# Patient Record
Sex: FEMALE | Race: OTHER
[De-identification: names, ages, dates, MRNs, and addresses within clinical notes are randomized per-mention and may not be internally consistent; named-entity substitution may affect disease eponyms.]

---

## 2020-07-02 LAB
ALBUMIN SERPL-MCNC: 3.7 G/DL (ref 3.4–5)
ALP SERPL-CCNC: 94 U/L (ref 45–117)
ALT SERPL-CCNC: 28 U/L (ref 13–56)
ANION GAP SERPL CALCULATED.3IONS-SCNC: 5 MMOL/L (ref 5–15)
APTT PPP: 27.5 SEC (ref 23.64–32.05)
BILIRUB SERPL-MCNC: 0.5 MG/DL (ref 0.2–1)
BUN SERPL-MCNC: 12 MG/DL (ref 7–18)
BUN/CREAT SERPL: 17.6
CALCIUM SERPL-MCNC: 8.9 MG/DL (ref 8.5–10.1)
CHLORIDE SERPL-SCNC: 110 MMOL/L (ref 98–107)
CO2 SERPL-SCNC: 26 MMOL/L (ref 21–32)
GLUCOSE SERPL-MCNC: 87 MG/DL (ref 74–106)
HCT VFR BLD AUTO: 44.9 % (ref 36–46)
HGB BLD-MCNC: 14.7 G/DL (ref 12.2–16.2)
INR PPP: 0.99 (ref 0.9–1.15)
MCH RBC QN AUTO: 27.8 PG (ref 28–32)
MCV RBC AUTO: 84.8 FL (ref 80–100)
NRBC BLD QL AUTO: 0 %
POTASSIUM SERPL-SCNC: 3.8 MMOL/L (ref 3.5–5.1)
PROT SERPL-MCNC: 7.7 G/DL (ref 6.4–8.2)
SODIUM SERPL-SCNC: 141 MMOL/L (ref 136–145)

## 2020-07-06 ENCOUNTER — HOSPITAL ENCOUNTER (INPATIENT)
Dept: HOSPITAL 15 - OVERFLOW | Age: 60
LOS: 2 days | Discharge: HOME | DRG: 470 | End: 2020-07-08
Attending: INTERNAL MEDICINE | Admitting: ORTHOPAEDIC SURGERY
Payer: COMMERCIAL

## 2020-07-06 VITALS — DIASTOLIC BLOOD PRESSURE: 69 MMHG | SYSTOLIC BLOOD PRESSURE: 115 MMHG

## 2020-07-06 VITALS — HEIGHT: 67 IN | WEIGHT: 220.68 LBS | BODY MASS INDEX: 34.64 KG/M2

## 2020-07-06 VITALS — DIASTOLIC BLOOD PRESSURE: 60 MMHG | SYSTOLIC BLOOD PRESSURE: 105 MMHG

## 2020-07-06 DIAGNOSIS — Z11.59: ICD-10-CM

## 2020-07-06 DIAGNOSIS — E66.9: ICD-10-CM

## 2020-07-06 DIAGNOSIS — F32.9: ICD-10-CM

## 2020-07-06 DIAGNOSIS — I10: ICD-10-CM

## 2020-07-06 DIAGNOSIS — M17.12: Primary | ICD-10-CM

## 2020-07-06 DIAGNOSIS — J45.909: ICD-10-CM

## 2020-07-06 PROCEDURE — 85018 HEMOGLOBIN: CPT

## 2020-07-06 PROCEDURE — 85025 COMPLETE CBC W/AUTO DIFF WBC: CPT

## 2020-07-06 PROCEDURE — 86900 BLOOD TYPING SEROLOGIC ABO: CPT

## 2020-07-06 PROCEDURE — 86901 BLOOD TYPING SEROLOGIC RH(D): CPT

## 2020-07-06 PROCEDURE — 85610 PROTHROMBIN TIME: CPT

## 2020-07-06 PROCEDURE — 36415 COLL VENOUS BLD VENIPUNCTURE: CPT

## 2020-07-06 PROCEDURE — 85730 THROMBOPLASTIN TIME PARTIAL: CPT

## 2020-07-06 PROCEDURE — 97163 PT EVAL HIGH COMPLEX 45 MIN: CPT

## 2020-07-06 PROCEDURE — 73562 X-RAY EXAM OF KNEE 3: CPT

## 2020-07-06 PROCEDURE — 81001 URINALYSIS AUTO W/SCOPE: CPT

## 2020-07-06 PROCEDURE — 86850 RBC ANTIBODY SCREEN: CPT

## 2020-07-06 PROCEDURE — 0SRD0J9 REPLACEMENT OF LEFT KNEE JOINT WITH SYNTHETIC SUBSTITUTE, CEMENTED, OPEN APPROACH: ICD-10-PCS | Performed by: ORTHOPAEDIC SURGERY

## 2020-07-06 PROCEDURE — 80053 COMPREHEN METABOLIC PANEL: CPT

## 2020-07-06 PROCEDURE — 85014 HEMATOCRIT: CPT

## 2020-07-06 RX ADMIN — HYDROCORTISONE SODIUM SUCCINATE SCH MG: 100 INJECTION, POWDER, FOR SOLUTION INTRAMUSCULAR; INTRAVENOUS at 23:00

## 2020-07-06 RX ADMIN — DOCUSATE SODIUM SCH MG: 100 CAPSULE, LIQUID FILLED ORAL at 22:00

## 2020-07-06 RX ADMIN — SODIUM CHLORIDE SCH ML: 9 INJECTION INTRAMUSCULAR; INTRAVENOUS; SUBCUTANEOUS at 22:00

## 2020-07-06 RX ADMIN — KETOROLAC TROMETHAMINE SCH MG: 30 INJECTION, SOLUTION INTRAMUSCULAR; INTRAVENOUS at 18:00

## 2020-07-06 RX ADMIN — SODIUM CHLORIDE, SODIUM LACTATE, POTASSIUM CHLORIDE, AND CALCIUM CHLORIDE SCH MLS/HR: .6; .31; .03; .02 INJECTION, SOLUTION INTRAVENOUS at 16:10

## 2020-07-06 RX ADMIN — CEFAZOLIN SODIUM SCH MLS/HR: 1 INJECTION, SOLUTION INTRAVENOUS at 21:09

## 2020-07-06 NOTE — NUR
ALBUTEROL 2.5MG ONCE/PRN NOT GIVEN. PT ESTATES SHE DOES NOT NEED IT . PT IS AWAKE, ALERT AND 
ORIENTED. PT ON RA, 95% O2 SATS, BS ARE DIMINISHED TO AUSCULTATION, SKIN IS DRY AND WARM TO 
THE TOUCH. HR  BPM, /70. WILL CONTINUE TO MONITOR PT.

## 2020-07-06 NOTE — NUR
S/P OR assessment

Patient to room  from O.R. procedure of total left knee arthroplasty. Vital signs taken, 
surgical site assessed for redness, swelling, or bleeding.Applied alexia hose,SCDs, continous 
Puls Ox. Patient instructed on need to inform staff immediately for any pain, swelling, 
bleeding or pulling or popping sensations at surgical site. Patient verbalized 
understanding.Duramorph protocol will be implemented.

## 2020-07-07 VITALS — SYSTOLIC BLOOD PRESSURE: 116 MMHG | DIASTOLIC BLOOD PRESSURE: 75 MMHG

## 2020-07-07 VITALS — DIASTOLIC BLOOD PRESSURE: 69 MMHG | SYSTOLIC BLOOD PRESSURE: 112 MMHG

## 2020-07-07 VITALS — SYSTOLIC BLOOD PRESSURE: 114 MMHG | DIASTOLIC BLOOD PRESSURE: 70 MMHG

## 2020-07-07 VITALS — SYSTOLIC BLOOD PRESSURE: 104 MMHG | DIASTOLIC BLOOD PRESSURE: 66 MMHG

## 2020-07-07 VITALS — SYSTOLIC BLOOD PRESSURE: 108 MMHG | DIASTOLIC BLOOD PRESSURE: 67 MMHG

## 2020-07-07 VITALS — SYSTOLIC BLOOD PRESSURE: 105 MMHG | DIASTOLIC BLOOD PRESSURE: 63 MMHG

## 2020-07-07 VITALS — SYSTOLIC BLOOD PRESSURE: 102 MMHG | DIASTOLIC BLOOD PRESSURE: 59 MMHG

## 2020-07-07 VITALS — SYSTOLIC BLOOD PRESSURE: 119 MMHG | DIASTOLIC BLOOD PRESSURE: 70 MMHG

## 2020-07-07 VITALS — DIASTOLIC BLOOD PRESSURE: 63 MMHG | SYSTOLIC BLOOD PRESSURE: 112 MMHG

## 2020-07-07 VITALS — DIASTOLIC BLOOD PRESSURE: 61 MMHG | SYSTOLIC BLOOD PRESSURE: 104 MMHG

## 2020-07-07 VITALS — DIASTOLIC BLOOD PRESSURE: 69 MMHG | SYSTOLIC BLOOD PRESSURE: 115 MMHG

## 2020-07-07 VITALS — SYSTOLIC BLOOD PRESSURE: 112 MMHG | DIASTOLIC BLOOD PRESSURE: 66 MMHG

## 2020-07-07 VITALS — DIASTOLIC BLOOD PRESSURE: 58 MMHG | SYSTOLIC BLOOD PRESSURE: 99 MMHG

## 2020-07-07 VITALS — DIASTOLIC BLOOD PRESSURE: 59 MMHG | SYSTOLIC BLOOD PRESSURE: 107 MMHG

## 2020-07-07 VITALS — DIASTOLIC BLOOD PRESSURE: 67 MMHG | SYSTOLIC BLOOD PRESSURE: 108 MMHG

## 2020-07-07 VITALS — DIASTOLIC BLOOD PRESSURE: 65 MMHG | SYSTOLIC BLOOD PRESSURE: 107 MMHG

## 2020-07-07 VITALS — DIASTOLIC BLOOD PRESSURE: 58 MMHG | SYSTOLIC BLOOD PRESSURE: 95 MMHG

## 2020-07-07 VITALS — SYSTOLIC BLOOD PRESSURE: 118 MMHG | DIASTOLIC BLOOD PRESSURE: 65 MMHG

## 2020-07-07 VITALS — SYSTOLIC BLOOD PRESSURE: 114 MMHG | DIASTOLIC BLOOD PRESSURE: 64 MMHG

## 2020-07-07 VITALS — DIASTOLIC BLOOD PRESSURE: 66 MMHG | SYSTOLIC BLOOD PRESSURE: 116 MMHG

## 2020-07-07 VITALS — DIASTOLIC BLOOD PRESSURE: 60 MMHG | SYSTOLIC BLOOD PRESSURE: 110 MMHG

## 2020-07-07 LAB
ALBUMIN SERPL-MCNC: 3 G/DL (ref 3.4–5)
ALP SERPL-CCNC: 74 U/L (ref 45–117)
ALT SERPL-CCNC: 22 U/L (ref 13–56)
ANION GAP SERPL CALCULATED.3IONS-SCNC: 4 MMOL/L (ref 5–15)
BILIRUB SERPL-MCNC: 0.5 MG/DL (ref 0.2–1)
BUN SERPL-MCNC: 14 MG/DL (ref 7–18)
BUN/CREAT SERPL: 17.9
CALCIUM SERPL-MCNC: 8.1 MG/DL (ref 8.5–10.1)
CHLORIDE SERPL-SCNC: 108 MMOL/L (ref 98–107)
CO2 SERPL-SCNC: 26 MMOL/L (ref 21–32)
GLUCOSE SERPL-MCNC: 107 MG/DL (ref 74–106)
HCT VFR BLD AUTO: 36.5 % (ref 36–46)
HGB BLD-MCNC: 12.1 G/DL (ref 12.2–16.2)
POTASSIUM SERPL-SCNC: 3.7 MMOL/L (ref 3.5–5.1)
PROT SERPL-MCNC: 6.4 G/DL (ref 6.4–8.2)
SODIUM SERPL-SCNC: 138 MMOL/L (ref 136–145)

## 2020-07-07 RX ADMIN — LOSARTAN POTASSIUM SCH MG: 50 TABLET, FILM COATED ORAL at 09:12

## 2020-07-07 RX ADMIN — SODIUM CHLORIDE SCH ML: 9 INJECTION INTRAMUSCULAR; INTRAVENOUS; SUBCUTANEOUS at 14:00

## 2020-07-07 RX ADMIN — DOCUSATE SODIUM SCH MG: 100 CAPSULE, LIQUID FILLED ORAL at 09:12

## 2020-07-07 RX ADMIN — DOCUSATE SODIUM SCH MG: 100 CAPSULE, LIQUID FILLED ORAL at 21:38

## 2020-07-07 RX ADMIN — KETOROLAC TROMETHAMINE SCH MG: 30 INJECTION, SOLUTION INTRAMUSCULAR; INTRAVENOUS at 00:09

## 2020-07-07 RX ADMIN — HYDROCORTISONE SODIUM SUCCINATE SCH MG: 100 INJECTION, POWDER, FOR SOLUTION INTRAMUSCULAR; INTRAVENOUS at 06:13

## 2020-07-07 RX ADMIN — KETOROLAC TROMETHAMINE SCH MG: 30 INJECTION, SOLUTION INTRAMUSCULAR; INTRAVENOUS at 23:46

## 2020-07-07 RX ADMIN — KETOROLAC TROMETHAMINE SCH MG: 30 INJECTION, SOLUTION INTRAMUSCULAR; INTRAVENOUS at 18:04

## 2020-07-07 RX ADMIN — FLUTICASONE PROPIONATE SCH MCG: 50 SPRAY, METERED NASAL at 09:12

## 2020-07-07 RX ADMIN — CEFAZOLIN SODIUM SCH MLS/HR: 1 INJECTION, SOLUTION INTRAVENOUS at 09:11

## 2020-07-07 RX ADMIN — KETOROLAC TROMETHAMINE SCH MG: 30 INJECTION, SOLUTION INTRAMUSCULAR; INTRAVENOUS at 11:45

## 2020-07-07 RX ADMIN — ALBUTEROL SULFATE SCH MCG: 90 AEROSOL, METERED RESPIRATORY (INHALATION) at 09:11

## 2020-07-07 RX ADMIN — CEFAZOLIN SODIUM SCH MLS/HR: 1 INJECTION, SOLUTION INTRAVENOUS at 03:15

## 2020-07-07 RX ADMIN — ENOXAPARIN SODIUM SCH MG: 40 INJECTION SUBCUTANEOUS at 09:14

## 2020-07-07 RX ADMIN — SERTRALINE HYDROCHLORIDE SCH MG: 50 TABLET, FILM COATED ORAL at 09:14

## 2020-07-07 RX ADMIN — SODIUM CHLORIDE SCH ML: 9 INJECTION INTRAMUSCULAR; INTRAVENOUS; SUBCUTANEOUS at 06:13

## 2020-07-07 RX ADMIN — MONTELUKAST SODIUM SCH MG: 10 TABLET, FILM COATED ORAL at 09:14

## 2020-07-07 RX ADMIN — HYDROCORTISONE SODIUM SUCCINATE SCH MG: 100 INJECTION, POWDER, FOR SOLUTION INTRAMUSCULAR; INTRAVENOUS at 14:00

## 2020-07-07 RX ADMIN — SODIUM CHLORIDE, SODIUM LACTATE, POTASSIUM CHLORIDE, AND CALCIUM CHLORIDE SCH MLS/HR: .6; .31; .03; .02 INJECTION, SOLUTION INTRAVENOUS at 11:45

## 2020-07-07 RX ADMIN — SODIUM CHLORIDE SCH ML: 9 INJECTION INTRAMUSCULAR; INTRAVENOUS; SUBCUTANEOUS at 21:38

## 2020-07-07 RX ADMIN — KETOROLAC TROMETHAMINE SCH MG: 30 INJECTION, SOLUTION INTRAMUSCULAR; INTRAVENOUS at 06:14

## 2020-07-07 RX ADMIN — HYDROCORTISONE SODIUM SUCCINATE SCH MG: 100 INJECTION, POWDER, FOR SOLUTION INTRAMUSCULAR; INTRAVENOUS at 21:38

## 2020-07-07 RX ADMIN — PANTOPRAZOLE SODIUM SCH MG: 40 TABLET, DELAYED RELEASE ORAL at 09:13

## 2020-07-07 RX ADMIN — SODIUM CHLORIDE, SODIUM LACTATE, POTASSIUM CHLORIDE, AND CALCIUM CHLORIDE SCH MLS/HR: .6; .31; .03; .02 INJECTION, SOLUTION INTRAVENOUS at 02:10

## 2020-07-07 NOTE — NUR
Respiratory note:

PT FOUND TO BE AWAKE/ALERT SITTING IN BED. DENIES ANY SOB OR DIFF BREATHING, NO DISTRESS 
NOTED. HR 80 RR 18 SPO2 98% ON 3L N/C. PT STATES DOES NOT NEED TX AT THIS TIME. PT AND RN 
AWARE TO HAVE PAGED IF NEEDED.

## 2020-07-07 NOTE — NUR
Assessment



Patient is a 59-year-old female who is alert and oriented. Prior to admission patient lived 
home with family and functioned independently. Patient informed me she does not have any 
medical equipment now. Per patient she will return home to her prior living arrangements 
post discharge and family will transport her home. Advised patient there is a social service 
consult for home health service for physical therapy, bed side commode, walker and CPM. 
Informed patient clinical information will be faxed to contracted agency for home health and 
[facility] for the medical equipment. Informed patient she has a right to participate in all 
discharge planning. Patient verbalized understanding and agreed to discharge plan home. 



Faxed clinical information to [facility] and [facility]. Per Rylie with [facility] medical 
equipment will be deliver to bedside. Per Sheba with [facility] patient has a 
1500 deductible and will have to pay 70% per visit. Per Sheba with [facility] suggested 
outpatient physical therapy as it will be a less co-pay for patient. Informed patient 
regarding home health and copay. Offered patient with option of outpatient physical therapy. 
Per Patient she will do outpatient physical therapy. Patient verbalize understanding d/c 
plan. Informed BOB Pablo 


-------------------------------------------------------------------------------

Addendum: 07/08/20 at 0908 by FAN MURO

-------------------------------------------------------------------------------

Amended: Links added.

## 2020-07-07 NOTE — NUR
Pain Medication Administration

Patient complaining of left knee pain 9/10. Patient requesting Norco to be administered. 
Explained pain medications and pain level scale to patient, patient verbalized 
understanding, continues to request Norco. Will administer pain medication per MD order. 
Will reassess pain level and will continue to monitor. 

-------------------------------------------------------------------------------

Addendum: 07/08/20 at 0747 by KRISTIN FOSS RN RN

-------------------------------------------------------------------------------

Correct Date 7/8/20

## 2020-07-08 VITALS — DIASTOLIC BLOOD PRESSURE: 80 MMHG | SYSTOLIC BLOOD PRESSURE: 141 MMHG

## 2020-07-08 VITALS — DIASTOLIC BLOOD PRESSURE: 62 MMHG | SYSTOLIC BLOOD PRESSURE: 106 MMHG

## 2020-07-08 VITALS — DIASTOLIC BLOOD PRESSURE: 76 MMHG | SYSTOLIC BLOOD PRESSURE: 139 MMHG

## 2020-07-08 VITALS — DIASTOLIC BLOOD PRESSURE: 73 MMHG | SYSTOLIC BLOOD PRESSURE: 124 MMHG

## 2020-07-08 LAB
HCT VFR BLD AUTO: 35.4 % (ref 36–46)
HGB BLD-MCNC: 11.7 G/DL (ref 12.2–16.2)

## 2020-07-08 RX ADMIN — SODIUM CHLORIDE SCH ML: 9 INJECTION INTRAMUSCULAR; INTRAVENOUS; SUBCUTANEOUS at 05:48

## 2020-07-08 RX ADMIN — ENOXAPARIN SODIUM SCH MG: 40 INJECTION SUBCUTANEOUS at 09:48

## 2020-07-08 RX ADMIN — SERTRALINE HYDROCHLORIDE SCH MG: 50 TABLET, FILM COATED ORAL at 09:49

## 2020-07-08 RX ADMIN — MONTELUKAST SODIUM SCH MG: 10 TABLET, FILM COATED ORAL at 09:48

## 2020-07-08 RX ADMIN — ALBUTEROL SULFATE SCH MCG: 90 AEROSOL, METERED RESPIRATORY (INHALATION) at 09:50

## 2020-07-08 RX ADMIN — DOCUSATE SODIUM SCH MG: 100 CAPSULE, LIQUID FILLED ORAL at 09:49

## 2020-07-08 RX ADMIN — PANTOPRAZOLE SODIUM SCH MG: 40 TABLET, DELAYED RELEASE ORAL at 09:49

## 2020-07-08 RX ADMIN — SODIUM CHLORIDE SCH ML: 9 INJECTION INTRAMUSCULAR; INTRAVENOUS; SUBCUTANEOUS at 14:13

## 2020-07-08 RX ADMIN — LOSARTAN POTASSIUM SCH MG: 50 TABLET, FILM COATED ORAL at 09:50

## 2020-07-08 RX ADMIN — FLUTICASONE PROPIONATE SCH MCG: 50 SPRAY, METERED NASAL at 09:50

## 2020-07-08 NOTE — NUR
Pain Level Reassessment

Patient asleep for pain level reassessment. No signs or symptoms of distress noted. Will 
continue to monitor.

## 2020-07-08 NOTE — NUR
ASSESSMENT NOTE

PT IS ALERT ORIENTED X4, RESTING IN BED COMFORTABLY, NO DISTRESS NOTED, ACE WRAP NOTED AT 
THE LEFT KNEE, SELF REPOSITION AS NEEDED, PAIN 0/10, CALL LIGHT WITHIN REACH

## 2020-07-08 NOTE — NUR
LOVENOX

PATIENT WAS ABLE TO GIVE HER SELF THE LOVENOX SHOT INDEPENDENTLY AFTER WATCHING AN EDUCATION 
VIDEO IN [employer] TWICE

## 2020-07-08 NOTE — NUR
DR LOWRY

AT BED SIDE, LEARNED THAT THE TEMP IS BACK TO NORMAL 98.7, INFORM PT THAT SHE CAN GO HOME 
AND COME BACK TO ER FOR ANY CHANGES

## 2020-07-08 NOTE — NUR
TEMP

100.2, SPOKE WITH DR GRUBER OVER THE PHONE, SAID TO GIVE PT TYLENOL ALSO MADE AWARE THAT DR LOWRY WILL ROUND ON HER

## 2020-07-08 NOTE — NUR
CPM

PT STATE I NEED THE PHYSICAL THERAPY TO LEARN HOW TO USE THE CPM MACHINE, SPOKE WITH KAVEH 
PT OVER THE PHONE SAID THAT HE WILL SEND SOMEONE THERE

## 2020-12-03 ENCOUNTER — HOSPITAL ENCOUNTER (EMERGENCY)
Dept: HOSPITAL 15 - ER | Age: 60
LOS: 1 days | Discharge: HOME | End: 2020-12-04
Payer: COMMERCIAL

## 2020-12-03 VITALS — BODY MASS INDEX: 32.96 KG/M2 | HEIGHT: 67 IN | WEIGHT: 210 LBS

## 2020-12-03 DIAGNOSIS — U07.1: Primary | ICD-10-CM

## 2020-12-03 DIAGNOSIS — E87.6: ICD-10-CM

## 2020-12-03 DIAGNOSIS — I95.9: ICD-10-CM

## 2020-12-03 LAB
ALBUMIN SERPL-MCNC: 3 G/DL (ref 3.4–5)
ALP SERPL-CCNC: 70 U/L (ref 45–117)
ALT SERPL-CCNC: 34 U/L (ref 13–56)
ANION GAP SERPL CALCULATED.3IONS-SCNC: 7 MMOL/L (ref 5–15)
BILIRUB SERPL-MCNC: 0.3 MG/DL (ref 0.2–1)
BUN SERPL-MCNC: 12 MG/DL (ref 7–18)
BUN/CREAT SERPL: 11.2
CALCIUM SERPL-MCNC: 7.7 MG/DL (ref 8.5–10.1)
CHLORIDE SERPL-SCNC: 107 MMOL/L (ref 98–107)
CO2 SERPL-SCNC: 23 MMOL/L (ref 21–32)
GLUCOSE SERPL-MCNC: 107 MG/DL (ref 74–106)
HCT VFR BLD AUTO: 35.4 % (ref 36–46)
HGB BLD-MCNC: 11.2 G/DL (ref 12.2–16.2)
MCH RBC QN AUTO: 24.3 PG (ref 28–32)
MCV RBC AUTO: 76.6 FL (ref 80–100)
NRBC BLD QL AUTO: 0.1 %
POTASSIUM SERPL-SCNC: 3.1 MMOL/L (ref 3.5–5.1)
PROT SERPL-MCNC: 6.9 G/DL (ref 6.4–8.2)
SODIUM SERPL-SCNC: 137 MMOL/L (ref 136–145)

## 2020-12-03 PROCEDURE — 99285 EMERGENCY DEPT VISIT HI MDM: CPT

## 2020-12-03 PROCEDURE — 84484 ASSAY OF TROPONIN QUANT: CPT

## 2020-12-03 PROCEDURE — 96374 THER/PROPH/DIAG INJ IV PUSH: CPT

## 2020-12-03 PROCEDURE — 36415 COLL VENOUS BLD VENIPUNCTURE: CPT

## 2020-12-03 PROCEDURE — 81001 URINALYSIS AUTO W/SCOPE: CPT

## 2020-12-03 PROCEDURE — 83880 ASSAY OF NATRIURETIC PEPTIDE: CPT

## 2020-12-03 PROCEDURE — 93005 ELECTROCARDIOGRAM TRACING: CPT

## 2020-12-03 PROCEDURE — 96361 HYDRATE IV INFUSION ADD-ON: CPT

## 2020-12-03 PROCEDURE — 80053 COMPREHEN METABOLIC PANEL: CPT

## 2020-12-03 PROCEDURE — 71045 X-RAY EXAM CHEST 1 VIEW: CPT

## 2020-12-03 PROCEDURE — 87426 SARSCOV CORONAVIRUS AG IA: CPT

## 2020-12-03 PROCEDURE — 70450 CT HEAD/BRAIN W/O DYE: CPT

## 2020-12-03 PROCEDURE — 85025 COMPLETE CBC W/AUTO DIFF WBC: CPT

## 2020-12-04 VITALS — DIASTOLIC BLOOD PRESSURE: 64 MMHG | SYSTOLIC BLOOD PRESSURE: 155 MMHG

## 2020-12-05 ENCOUNTER — HOSPITAL ENCOUNTER (INPATIENT)
Dept: HOSPITAL 15 - ER | Age: 60
LOS: 41 days | Discharge: HOME | DRG: 710 | End: 2021-01-15
Attending: INTERNAL MEDICINE | Admitting: INTERNAL MEDICINE
Payer: COMMERCIAL

## 2020-12-05 VITALS — HEIGHT: 67 IN | BODY MASS INDEX: 31.07 KG/M2 | WEIGHT: 197.98 LBS

## 2020-12-05 DIAGNOSIS — D68.59: ICD-10-CM

## 2020-12-05 DIAGNOSIS — E66.9: ICD-10-CM

## 2020-12-05 DIAGNOSIS — J98.2: ICD-10-CM
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
## 2020-12-05 DIAGNOSIS — J96.01: ICD-10-CM

## 2020-12-05 DIAGNOSIS — R73.9: ICD-10-CM

## 2020-12-05 DIAGNOSIS — I10: ICD-10-CM

## 2020-12-05 DIAGNOSIS — D89.839: ICD-10-CM

## 2020-12-05 DIAGNOSIS — J45.909: ICD-10-CM

## 2020-12-05 DIAGNOSIS — Z79.899: ICD-10-CM

## 2020-12-05 DIAGNOSIS — J12.82: ICD-10-CM

## 2020-12-05 DIAGNOSIS — M19.90: ICD-10-CM

## 2020-12-05 DIAGNOSIS — F32.9: ICD-10-CM

## 2020-12-05 DIAGNOSIS — U07.1: ICD-10-CM

## 2020-12-05 DIAGNOSIS — E44.0: ICD-10-CM

## 2020-12-05 DIAGNOSIS — K65.9: ICD-10-CM

## 2020-12-05 DIAGNOSIS — A41.89: Primary | ICD-10-CM

## 2020-12-05 DIAGNOSIS — E87.6: ICD-10-CM

## 2020-12-05 DIAGNOSIS — E66.01: ICD-10-CM

## 2020-12-05 DIAGNOSIS — K57.20: ICD-10-CM

## 2020-12-05 LAB
ALBUMIN SERPL-MCNC: 2.9 G/DL (ref 3.4–5)
ALP SERPL-CCNC: 74 U/L (ref 45–117)
ALT SERPL-CCNC: 43 U/L (ref 13–56)
ANION GAP SERPL CALCULATED.3IONS-SCNC: 9 MMOL/L (ref 5–15)
BILIRUB SERPL-MCNC: 0.3 MG/DL (ref 0.2–1)
BUN SERPL-MCNC: 10 MG/DL (ref 7–18)
BUN/CREAT SERPL: 12.2
CALCIUM SERPL-MCNC: 9 MG/DL (ref 8.5–10.1)
CHLORIDE SERPL-SCNC: 110 MMOL/L (ref 98–107)
CO2 SERPL-SCNC: 24 MMOL/L (ref 21–32)
GLUCOSE SERPL-MCNC: 150 MG/DL (ref 74–106)
HCT VFR BLD AUTO: 36 % (ref 36–46)
HGB BLD-MCNC: 11.7 G/DL (ref 12.2–16.2)
LACTATE PLASV-SCNC: 3.3 MMOL/L (ref 0.4–2)
MAGNESIUM SERPL-MCNC: 2.1 MG/DL (ref 1.6–2.6)
MCH RBC QN AUTO: 24.6 PG (ref 28–32)
MCV RBC AUTO: 75.4 FL (ref 80–100)
NRBC BLD QL AUTO: 0.1 %
POTASSIUM SERPL-SCNC: 3.6 MMOL/L (ref 3.5–5.1)
PROT SERPL-MCNC: 7.2 G/DL (ref 6.4–8.2)
SODIUM SERPL-SCNC: 143 MMOL/L (ref 136–145)

## 2020-12-05 PROCEDURE — 84484 ASSAY OF TROPONIN QUANT: CPT

## 2020-12-05 PROCEDURE — 85379 FIBRIN DEGRADATION QUANT: CPT

## 2020-12-05 PROCEDURE — 86850 RBC ANTIBODY SCREEN: CPT

## 2020-12-05 PROCEDURE — 85027 COMPLETE CBC AUTOMATED: CPT

## 2020-12-05 PROCEDURE — 82306 VITAMIN D 25 HYDROXY: CPT

## 2020-12-05 PROCEDURE — 80053 COMPREHEN METABOLIC PANEL: CPT

## 2020-12-05 PROCEDURE — 93005 ELECTROCARDIOGRAM TRACING: CPT

## 2020-12-05 PROCEDURE — 97110 THERAPEUTIC EXERCISES: CPT

## 2020-12-05 PROCEDURE — 97530 THERAPEUTIC ACTIVITIES: CPT

## 2020-12-05 PROCEDURE — 74176 CT ABD & PELVIS W/O CONTRAST: CPT

## 2020-12-05 PROCEDURE — 82728 ASSAY OF FERRITIN: CPT

## 2020-12-05 PROCEDURE — 83880 ASSAY OF NATRIURETIC PEPTIDE: CPT

## 2020-12-05 PROCEDURE — 87493 C DIFF AMPLIFIED PROBE: CPT

## 2020-12-05 PROCEDURE — XW033E5 INTRODUCTION OF REMDESIVIR ANTI-INFECTIVE INTO PERIPHERAL VEIN, PERCUTANEOUS APPROACH, NEW TECHNOLOGY GROUP 5: ICD-10-PCS | Performed by: INTERNAL MEDICINE

## 2020-12-05 PROCEDURE — 85610 PROTHROMBIN TIME: CPT

## 2020-12-05 PROCEDURE — 82040 ASSAY OF SERUM ALBUMIN: CPT

## 2020-12-05 PROCEDURE — 0W9G00Z DRAINAGE OF PERITONEAL CAVITY WITH DRAINAGE DEVICE, OPEN APPROACH: ICD-10-PCS | Performed by: SURGERY

## 2020-12-05 PROCEDURE — 83690 ASSAY OF LIPASE: CPT

## 2020-12-05 PROCEDURE — 85730 THROMBOPLASTIN TIME PARTIAL: CPT

## 2020-12-05 PROCEDURE — 83615 LACTATE (LD) (LDH) ENZYME: CPT

## 2020-12-05 PROCEDURE — 81001 URINALYSIS AUTO W/SCOPE: CPT

## 2020-12-05 PROCEDURE — 85025 COMPLETE CBC W/AUTO DIFF WBC: CPT

## 2020-12-05 PROCEDURE — 93970 EXTREMITY STUDY: CPT

## 2020-12-05 PROCEDURE — 86900 BLOOD TYPING SEROLOGIC ABO: CPT

## 2020-12-05 PROCEDURE — 86901 BLOOD TYPING SEROLOGIC RH(D): CPT

## 2020-12-05 PROCEDURE — 83735 ASSAY OF MAGNESIUM: CPT

## 2020-12-05 PROCEDURE — 71045 X-RAY EXAM CHEST 1 VIEW: CPT

## 2020-12-05 PROCEDURE — 85007 BL SMEAR W/DIFF WBC COUNT: CPT

## 2020-12-05 PROCEDURE — 83036 HEMOGLOBIN GLYCOSYLATED A1C: CPT

## 2020-12-05 PROCEDURE — 94640 AIRWAY INHALATION TREATMENT: CPT

## 2020-12-05 PROCEDURE — 84478 ASSAY OF TRIGLYCERIDES: CPT

## 2020-12-05 PROCEDURE — 87086 URINE CULTURE/COLONY COUNT: CPT

## 2020-12-05 PROCEDURE — 80076 HEPATIC FUNCTION PANEL: CPT

## 2020-12-05 PROCEDURE — 87040 BLOOD CULTURE FOR BACTERIA: CPT

## 2020-12-05 PROCEDURE — 87081 CULTURE SCREEN ONLY: CPT

## 2020-12-05 PROCEDURE — 83605 ASSAY OF LACTIC ACID: CPT

## 2020-12-05 PROCEDURE — 71250 CT THORAX DX C-: CPT

## 2020-12-05 PROCEDURE — 97116 GAIT TRAINING THERAPY: CPT

## 2020-12-05 PROCEDURE — 36415 COLL VENOUS BLD VENIPUNCTURE: CPT

## 2020-12-05 PROCEDURE — 0DNU0ZZ RELEASE OMENTUM, OPEN APPROACH: ICD-10-PCS | Performed by: SURGERY

## 2020-12-05 PROCEDURE — 80202 ASSAY OF VANCOMYCIN: CPT

## 2020-12-05 PROCEDURE — 80048 BASIC METABOLIC PNL TOTAL CA: CPT

## 2020-12-05 PROCEDURE — 84443 ASSAY THYROID STIM HORMONE: CPT

## 2020-12-05 PROCEDURE — 84100 ASSAY OF PHOSPHORUS: CPT

## 2020-12-05 PROCEDURE — 86141 C-REACTIVE PROTEIN HS: CPT

## 2020-12-05 PROCEDURE — 82962 GLUCOSE BLOOD TEST: CPT

## 2020-12-05 RX ADMIN — FAMOTIDINE SCH MG: 20 TABLET, FILM COATED ORAL at 21:45

## 2020-12-05 RX ADMIN — HUMAN INSULIN SCH UNITS: 100 INJECTION, SOLUTION SUBCUTANEOUS at 22:31

## 2020-12-05 RX ADMIN — BUDESONIDE SCH MCG: 180 AEROSOL, POWDER RESPIRATORY (INHALATION) at 22:00

## 2020-12-05 RX ADMIN — Medication SCH STRIP: at 22:16

## 2020-12-06 VITALS — SYSTOLIC BLOOD PRESSURE: 132 MMHG | DIASTOLIC BLOOD PRESSURE: 80 MMHG

## 2020-12-06 VITALS — SYSTOLIC BLOOD PRESSURE: 124 MMHG | DIASTOLIC BLOOD PRESSURE: 53 MMHG

## 2020-12-06 VITALS — DIASTOLIC BLOOD PRESSURE: 73 MMHG | SYSTOLIC BLOOD PRESSURE: 135 MMHG

## 2020-12-06 LAB
ALBUMIN SERPL-MCNC: 2.7 G/DL (ref 3.4–5)
ALP SERPL-CCNC: 76 U/L (ref 45–117)
ALT SERPL-CCNC: 48 U/L (ref 13–56)
ANION GAP SERPL CALCULATED.3IONS-SCNC: 9 MMOL/L (ref 5–15)
BILIRUB SERPL-MCNC: 0.3 MG/DL (ref 0.2–1)
BUN SERPL-MCNC: 12 MG/DL (ref 7–18)
BUN/CREAT SERPL: 17.6
CALCIUM SERPL-MCNC: 8.7 MG/DL (ref 8.5–10.1)
CHLORIDE SERPL-SCNC: 111 MMOL/L (ref 98–107)
CO2 SERPL-SCNC: 23 MMOL/L (ref 21–32)
GLUCOSE SERPL-MCNC: 134 MG/DL (ref 74–106)
HCT VFR BLD AUTO: 34.6 % (ref 36–46)
HGB BLD-MCNC: 11.3 G/DL (ref 12.2–16.2)
MCH RBC QN AUTO: 24.2 PG (ref 28–32)
MCV RBC AUTO: 74.5 FL (ref 80–100)
NRBC BLD QL AUTO: 0 %
POTASSIUM SERPL-SCNC: 3.3 MMOL/L (ref 3.5–5.1)
PROT SERPL-MCNC: 7.2 G/DL (ref 6.4–8.2)
SODIUM SERPL-SCNC: 143 MMOL/L (ref 136–145)

## 2020-12-06 RX ADMIN — BUDESONIDE SCH MCG: 180 AEROSOL, POWDER RESPIRATORY (INHALATION) at 10:00

## 2020-12-06 RX ADMIN — Medication SCH MG: at 10:00

## 2020-12-06 RX ADMIN — Medication SCH STRIP: at 18:19

## 2020-12-06 RX ADMIN — Medication SCH UNIT: at 10:00

## 2020-12-06 RX ADMIN — Medication SCH STRIP: at 06:58

## 2020-12-06 RX ADMIN — HUMAN INSULIN SCH UNITS: 100 INJECTION, SOLUTION SUBCUTANEOUS at 06:58

## 2020-12-06 RX ADMIN — Medication SCH STRIP: at 12:40

## 2020-12-06 RX ADMIN — HUMAN INSULIN SCH UNITS: 100 INJECTION, SOLUTION SUBCUTANEOUS at 12:50

## 2020-12-06 RX ADMIN — ZINC SULFATE CAP 220 MG (50 MG ELEMENTAL ZN) SCH MG: 220 (50 ZN) CAP at 10:00

## 2020-12-06 RX ADMIN — FAMOTIDINE SCH MG: 20 TABLET, FILM COATED ORAL at 10:00

## 2020-12-06 RX ADMIN — HUMAN INSULIN SCH UNITS: 100 INJECTION, SOLUTION SUBCUTANEOUS at 18:30

## 2020-12-06 RX ADMIN — ALBUTEROL SULFATE PRN MCG: 108 AEROSOL, METERED RESPIRATORY (INHALATION) at 23:18

## 2020-12-06 RX ADMIN — BUDESONIDE SCH MCG: 180 AEROSOL, POWDER RESPIRATORY (INHALATION) at 22:00

## 2020-12-07 VITALS — DIASTOLIC BLOOD PRESSURE: 69 MMHG | SYSTOLIC BLOOD PRESSURE: 145 MMHG

## 2020-12-07 VITALS — DIASTOLIC BLOOD PRESSURE: 66 MMHG | SYSTOLIC BLOOD PRESSURE: 135 MMHG

## 2020-12-07 VITALS — SYSTOLIC BLOOD PRESSURE: 116 MMHG | DIASTOLIC BLOOD PRESSURE: 75 MMHG

## 2020-12-07 VITALS — SYSTOLIC BLOOD PRESSURE: 126 MMHG | DIASTOLIC BLOOD PRESSURE: 65 MMHG

## 2020-12-07 VITALS — SYSTOLIC BLOOD PRESSURE: 134 MMHG | DIASTOLIC BLOOD PRESSURE: 78 MMHG

## 2020-12-07 RX ADMIN — ZINC SULFATE CAP 220 MG (50 MG ELEMENTAL ZN) SCH MG: 220 (50 ZN) CAP at 11:10

## 2020-12-07 RX ADMIN — HUMAN INSULIN SCH UNITS: 100 INJECTION, SOLUTION SUBCUTANEOUS at 22:00

## 2020-12-07 RX ADMIN — HUMAN INSULIN SCH UNITS: 100 INJECTION, SOLUTION SUBCUTANEOUS at 06:31

## 2020-12-07 RX ADMIN — HUMAN INSULIN SCH UNITS: 100 INJECTION, SOLUTION SUBCUTANEOUS at 00:02

## 2020-12-07 RX ADMIN — BUDESONIDE SCH MCG: 180 AEROSOL, POWDER RESPIRATORY (INHALATION) at 06:46

## 2020-12-07 RX ADMIN — BUDESONIDE SCH MCG: 180 AEROSOL, POWDER RESPIRATORY (INHALATION) at 19:30

## 2020-12-07 RX ADMIN — FAMOTIDINE SCH MG: 20 TABLET, FILM COATED ORAL at 00:01

## 2020-12-07 RX ADMIN — MONTELUKAST SODIUM SCH MG: 10 TABLET, FILM COATED ORAL at 21:57

## 2020-12-07 RX ADMIN — TEMAZEPAM PRN MG: 15 CAPSULE ORAL at 21:57

## 2020-12-07 RX ADMIN — FAMOTIDINE SCH MG: 20 TABLET, FILM COATED ORAL at 11:10

## 2020-12-07 RX ADMIN — HUMAN INSULIN SCH UNITS: 100 INJECTION, SOLUTION SUBCUTANEOUS at 12:09

## 2020-12-07 RX ADMIN — TEMAZEPAM PRN MG: 15 CAPSULE ORAL at 01:56

## 2020-12-07 RX ADMIN — HUMAN INSULIN SCH UNITS: 100 INJECTION, SOLUTION SUBCUTANEOUS at 18:08

## 2020-12-07 RX ADMIN — Medication SCH STRIP: at 21:56

## 2020-12-07 RX ADMIN — ALBUTEROL SULFATE PRN MCG: 108 AEROSOL, METERED RESPIRATORY (INHALATION) at 14:35

## 2020-12-07 RX ADMIN — ONDANSETRON HYDROCHLORIDE PRN MG: 2 INJECTION, SOLUTION INTRAMUSCULAR; INTRAVENOUS at 12:10

## 2020-12-07 RX ADMIN — SODIUM CHLORIDE SCH MLS/HR: 0.9 INJECTION, SOLUTION INTRAVENOUS at 18:07

## 2020-12-07 RX ADMIN — Medication SCH STRIP: at 00:01

## 2020-12-07 RX ADMIN — ENOXAPARIN SODIUM SCH MG: 60 INJECTION SUBCUTANEOUS at 21:56

## 2020-12-07 RX ADMIN — Medication SCH STRIP: at 11:30

## 2020-12-07 RX ADMIN — Medication SCH STRIP: at 17:00

## 2020-12-07 RX ADMIN — FAMOTIDINE SCH MG: 20 TABLET, FILM COATED ORAL at 21:57

## 2020-12-07 RX ADMIN — Medication SCH STRIP: at 06:31

## 2020-12-07 RX ADMIN — Medication SCH MG: at 11:11

## 2020-12-07 RX ADMIN — Medication SCH UNIT: at 11:11

## 2020-12-08 VITALS — DIASTOLIC BLOOD PRESSURE: 56 MMHG | SYSTOLIC BLOOD PRESSURE: 122 MMHG

## 2020-12-08 VITALS — DIASTOLIC BLOOD PRESSURE: 57 MMHG | SYSTOLIC BLOOD PRESSURE: 119 MMHG

## 2020-12-08 VITALS — SYSTOLIC BLOOD PRESSURE: 134 MMHG | DIASTOLIC BLOOD PRESSURE: 78 MMHG

## 2020-12-08 VITALS — DIASTOLIC BLOOD PRESSURE: 73 MMHG | SYSTOLIC BLOOD PRESSURE: 150 MMHG

## 2020-12-08 VITALS — DIASTOLIC BLOOD PRESSURE: 66 MMHG | SYSTOLIC BLOOD PRESSURE: 129 MMHG

## 2020-12-08 VITALS — DIASTOLIC BLOOD PRESSURE: 81 MMHG | SYSTOLIC BLOOD PRESSURE: 129 MMHG

## 2020-12-08 VITALS — SYSTOLIC BLOOD PRESSURE: 128 MMHG | DIASTOLIC BLOOD PRESSURE: 62 MMHG

## 2020-12-08 VITALS — SYSTOLIC BLOOD PRESSURE: 131 MMHG | DIASTOLIC BLOOD PRESSURE: 70 MMHG

## 2020-12-08 VITALS — DIASTOLIC BLOOD PRESSURE: 60 MMHG | SYSTOLIC BLOOD PRESSURE: 136 MMHG

## 2020-12-08 LAB
ALBUMIN SERPL-MCNC: 2.4 G/DL (ref 3.4–5)
ALP SERPL-CCNC: 72 U/L (ref 45–117)
ALT SERPL-CCNC: 39 U/L (ref 13–56)
ANION GAP SERPL CALCULATED.3IONS-SCNC: 7 MMOL/L (ref 5–15)
BILIRUB SERPL-MCNC: 0.4 MG/DL (ref 0.2–1)
BUN SERPL-MCNC: 18 MG/DL (ref 7–18)
BUN/CREAT SERPL: 28.6
CALCIUM SERPL-MCNC: 8.4 MG/DL (ref 8.5–10.1)
CHLORIDE SERPL-SCNC: 114 MMOL/L (ref 98–107)
CO2 SERPL-SCNC: 28 MMOL/L (ref 21–32)
GLUCOSE SERPL-MCNC: 123 MG/DL (ref 74–106)
HCT VFR BLD AUTO: 36.3 % (ref 36–46)
HGB BLD-MCNC: 11.8 G/DL (ref 12.2–16.2)
MCH RBC QN AUTO: 24.6 PG (ref 28–32)
MCV RBC AUTO: 76 FL (ref 80–100)
NRBC BLD QL AUTO: 0 %
POTASSIUM SERPL-SCNC: 3.1 MMOL/L (ref 3.5–5.1)
PROT SERPL-MCNC: 6.5 G/DL (ref 6.4–8.2)
SODIUM SERPL-SCNC: 149 MMOL/L (ref 136–145)

## 2020-12-08 PROCEDURE — XW13325 TRANSFUSION OF CONVALESCENT PLASMA (NONAUTOLOGOUS) INTO PERIPHERAL VEIN, PERCUTANEOUS APPROACH, NEW TECHNOLOGY GROUP 5: ICD-10-PCS | Performed by: INTERNAL MEDICINE

## 2020-12-08 RX ADMIN — Medication SCH STRIP: at 22:00

## 2020-12-08 RX ADMIN — HUMAN INSULIN SCH UNITS: 100 INJECTION, SOLUTION SUBCUTANEOUS at 06:38

## 2020-12-08 RX ADMIN — SODIUM CHLORIDE SCH MLS/HR: 0.9 INJECTION, SOLUTION INTRAVENOUS at 16:54

## 2020-12-08 RX ADMIN — ALBUTEROL SULFATE PRN MCG: 108 AEROSOL, METERED RESPIRATORY (INHALATION) at 22:43

## 2020-12-08 RX ADMIN — Medication SCH MG: at 09:21

## 2020-12-08 RX ADMIN — POTASSIUM CHLORIDE SCH MEQ: 1500 TABLET, EXTENDED RELEASE ORAL at 09:22

## 2020-12-08 RX ADMIN — ALBUTEROL SULFATE PRN MCG: 108 AEROSOL, METERED RESPIRATORY (INHALATION) at 07:40

## 2020-12-08 RX ADMIN — FAMOTIDINE SCH MG: 20 TABLET, FILM COATED ORAL at 09:21

## 2020-12-08 RX ADMIN — HUMAN INSULIN SCH UNITS: 100 INJECTION, SOLUTION SUBCUTANEOUS at 17:57

## 2020-12-08 RX ADMIN — MONTELUKAST SODIUM SCH MG: 10 TABLET, FILM COATED ORAL at 22:30

## 2020-12-08 RX ADMIN — HUMAN INSULIN SCH UNITS: 100 INJECTION, SOLUTION SUBCUTANEOUS at 12:43

## 2020-12-08 RX ADMIN — HUMAN INSULIN SCH UNITS: 100 INJECTION, SOLUTION SUBCUTANEOUS at 22:31

## 2020-12-08 RX ADMIN — Medication SCH STRIP: at 06:37

## 2020-12-08 RX ADMIN — ZINC SULFATE CAP 220 MG (50 MG ELEMENTAL ZN) SCH MG: 220 (50 ZN) CAP at 09:21

## 2020-12-08 RX ADMIN — ENOXAPARIN SODIUM SCH MG: 60 INJECTION SUBCUTANEOUS at 22:31

## 2020-12-08 RX ADMIN — Medication SCH UNIT: at 09:21

## 2020-12-08 RX ADMIN — Medication SCH ML: at 17:02

## 2020-12-08 RX ADMIN — ALBUTEROL SULFATE PRN MCG: 108 AEROSOL, METERED RESPIRATORY (INHALATION) at 01:00

## 2020-12-08 RX ADMIN — BUDESONIDE SCH MCG: 180 AEROSOL, POWDER RESPIRATORY (INHALATION) at 07:40

## 2020-12-08 RX ADMIN — BUDESONIDE SCH MCG: 180 AEROSOL, POWDER RESPIRATORY (INHALATION) at 22:43

## 2020-12-08 RX ADMIN — ENOXAPARIN SODIUM SCH MG: 60 INJECTION SUBCUTANEOUS at 09:22

## 2020-12-08 RX ADMIN — Medication SCH STRIP: at 17:02

## 2020-12-08 RX ADMIN — Medication SCH STRIP: at 12:42

## 2020-12-08 RX ADMIN — FAMOTIDINE SCH MG: 20 TABLET, FILM COATED ORAL at 22:30

## 2020-12-08 RX ADMIN — FUROSEMIDE SCH MG: 10 INJECTION, SOLUTION INTRAMUSCULAR; INTRAVENOUS at 09:19

## 2020-12-09 VITALS — DIASTOLIC BLOOD PRESSURE: 71 MMHG | SYSTOLIC BLOOD PRESSURE: 128 MMHG

## 2020-12-09 VITALS — SYSTOLIC BLOOD PRESSURE: 129 MMHG | DIASTOLIC BLOOD PRESSURE: 74 MMHG

## 2020-12-09 VITALS — SYSTOLIC BLOOD PRESSURE: 115 MMHG | DIASTOLIC BLOOD PRESSURE: 58 MMHG

## 2020-12-09 VITALS — SYSTOLIC BLOOD PRESSURE: 135 MMHG | DIASTOLIC BLOOD PRESSURE: 58 MMHG

## 2020-12-09 VITALS — SYSTOLIC BLOOD PRESSURE: 136 MMHG | DIASTOLIC BLOOD PRESSURE: 77 MMHG

## 2020-12-09 LAB
ANION GAP SERPL CALCULATED.3IONS-SCNC: 10 MMOL/L (ref 5–15)
BUN SERPL-MCNC: 14 MG/DL (ref 7–18)
BUN/CREAT SERPL: 21.9
CALCIUM SERPL-MCNC: 8.8 MG/DL (ref 8.5–10.1)
CHLORIDE SERPL-SCNC: 107 MMOL/L (ref 98–107)
CO2 SERPL-SCNC: 28 MMOL/L (ref 21–32)
GLUCOSE SERPL-MCNC: 105 MG/DL (ref 74–106)
POTASSIUM SERPL-SCNC: 3.2 MMOL/L (ref 3.5–5.1)
SODIUM SERPL-SCNC: 145 MMOL/L (ref 136–145)

## 2020-12-09 RX ADMIN — PHENOL PRN LOZ: 14.5 LOZENGE ORAL at 15:25

## 2020-12-09 RX ADMIN — ENOXAPARIN SODIUM SCH MG: 60 INJECTION SUBCUTANEOUS at 09:57

## 2020-12-09 RX ADMIN — POTASSIUM CHLORIDE SCH MEQ: 1500 TABLET, EXTENDED RELEASE ORAL at 09:56

## 2020-12-09 RX ADMIN — Medication SCH MG: at 09:57

## 2020-12-09 RX ADMIN — HUMAN INSULIN SCH UNITS: 100 INJECTION, SOLUTION SUBCUTANEOUS at 21:55

## 2020-12-09 RX ADMIN — FAMOTIDINE SCH MG: 20 TABLET, FILM COATED ORAL at 09:56

## 2020-12-09 RX ADMIN — HUMAN INSULIN SCH UNITS: 100 INJECTION, SOLUTION SUBCUTANEOUS at 06:11

## 2020-12-09 RX ADMIN — LORAZEPAM PRN MG: 2 INJECTION, SOLUTION INTRAMUSCULAR; INTRAVENOUS at 03:46

## 2020-12-09 RX ADMIN — PHENOL PRN LOZ: 14.5 LOZENGE ORAL at 12:28

## 2020-12-09 RX ADMIN — BUDESONIDE SCH MCG: 180 AEROSOL, POWDER RESPIRATORY (INHALATION) at 07:45

## 2020-12-09 RX ADMIN — HUMAN INSULIN SCH UNITS: 100 INJECTION, SOLUTION SUBCUTANEOUS at 12:21

## 2020-12-09 RX ADMIN — Medication SCH ML: at 12:00

## 2020-12-09 RX ADMIN — NYSTATIN SCH ML: 500000 SUSPENSION ORAL at 21:53

## 2020-12-09 RX ADMIN — Medication SCH UNIT: at 09:57

## 2020-12-09 RX ADMIN — FUROSEMIDE SCH MG: 10 INJECTION, SOLUTION INTRAMUSCULAR; INTRAVENOUS at 09:56

## 2020-12-09 RX ADMIN — ZINC SULFATE CAP 220 MG (50 MG ELEMENTAL ZN) SCH MG: 220 (50 ZN) CAP at 09:56

## 2020-12-09 RX ADMIN — ENOXAPARIN SODIUM SCH MG: 60 INJECTION SUBCUTANEOUS at 21:54

## 2020-12-09 RX ADMIN — NYSTATIN SCH ML: 500000 SUSPENSION ORAL at 18:49

## 2020-12-09 RX ADMIN — HUMAN INSULIN SCH UNITS: 100 INJECTION, SOLUTION SUBCUTANEOUS at 18:48

## 2020-12-09 RX ADMIN — Medication SCH ML: at 08:00

## 2020-12-09 RX ADMIN — Medication SCH STRIP: at 18:47

## 2020-12-09 RX ADMIN — Medication SCH ML: at 18:00

## 2020-12-09 RX ADMIN — BUDESONIDE SCH MCG: 180 AEROSOL, POWDER RESPIRATORY (INHALATION) at 06:49

## 2020-12-09 RX ADMIN — Medication SCH STRIP: at 11:56

## 2020-12-09 RX ADMIN — SODIUM CHLORIDE SCH MLS/HR: 0.9 INJECTION, SOLUTION INTRAVENOUS at 18:46

## 2020-12-09 RX ADMIN — MONTELUKAST SODIUM SCH MG: 10 TABLET, FILM COATED ORAL at 21:54

## 2020-12-09 RX ADMIN — Medication SCH STRIP: at 06:11

## 2020-12-09 RX ADMIN — Medication SCH STRIP: at 21:54

## 2020-12-09 RX ADMIN — FAMOTIDINE SCH MG: 20 TABLET, FILM COATED ORAL at 21:54

## 2020-12-09 RX ADMIN — ALBUTEROL SULFATE PRN MCG: 108 AEROSOL, METERED RESPIRATORY (INHALATION) at 07:45

## 2020-12-09 RX ADMIN — PROMETHAZINE HYDROCHLORIDE AND CODEINE PHOSPHATE PRN ML: 6.25; 1 SOLUTION ORAL at 20:30

## 2020-12-10 VITALS — DIASTOLIC BLOOD PRESSURE: 64 MMHG | SYSTOLIC BLOOD PRESSURE: 113 MMHG

## 2020-12-10 VITALS — DIASTOLIC BLOOD PRESSURE: 66 MMHG | SYSTOLIC BLOOD PRESSURE: 111 MMHG

## 2020-12-10 VITALS — SYSTOLIC BLOOD PRESSURE: 150 MMHG | DIASTOLIC BLOOD PRESSURE: 64 MMHG

## 2020-12-10 VITALS — DIASTOLIC BLOOD PRESSURE: 66 MMHG | SYSTOLIC BLOOD PRESSURE: 141 MMHG

## 2020-12-10 VITALS — DIASTOLIC BLOOD PRESSURE: 64 MMHG | SYSTOLIC BLOOD PRESSURE: 110 MMHG

## 2020-12-10 RX ADMIN — PROMETHAZINE HYDROCHLORIDE AND CODEINE PHOSPHATE PRN ML: 6.25; 1 SOLUTION ORAL at 12:28

## 2020-12-10 RX ADMIN — ENOXAPARIN SODIUM SCH MG: 60 INJECTION SUBCUTANEOUS at 22:07

## 2020-12-10 RX ADMIN — FAMOTIDINE SCH MG: 20 TABLET, FILM COATED ORAL at 22:07

## 2020-12-10 RX ADMIN — POTASSIUM CHLORIDE SCH MEQ: 1500 TABLET, EXTENDED RELEASE ORAL at 12:03

## 2020-12-10 RX ADMIN — MONTELUKAST SODIUM SCH MG: 10 TABLET, FILM COATED ORAL at 22:07

## 2020-12-10 RX ADMIN — SODIUM CHLORIDE SCH MLS/HR: 0.9 INJECTION, SOLUTION INTRAVENOUS at 17:11

## 2020-12-10 RX ADMIN — NYSTATIN SCH ML: 500000 SUSPENSION ORAL at 22:06

## 2020-12-10 RX ADMIN — Medication SCH MG: at 12:03

## 2020-12-10 RX ADMIN — HUMAN INSULIN SCH UNITS: 100 INJECTION, SOLUTION SUBCUTANEOUS at 11:30

## 2020-12-10 RX ADMIN — NYSTATIN SCH ML: 500000 SUSPENSION ORAL at 06:38

## 2020-12-10 RX ADMIN — BUDESONIDE SCH MCG: 180 AEROSOL, POWDER RESPIRATORY (INHALATION) at 10:00

## 2020-12-10 RX ADMIN — FAMOTIDINE SCH MG: 20 TABLET, FILM COATED ORAL at 12:03

## 2020-12-10 RX ADMIN — ALBUTEROL SULFATE PRN MCG: 108 AEROSOL, METERED RESPIRATORY (INHALATION) at 19:48

## 2020-12-10 RX ADMIN — FUROSEMIDE SCH MG: 10 INJECTION, SOLUTION INTRAMUSCULAR; INTRAVENOUS at 12:02

## 2020-12-10 RX ADMIN — Medication SCH STRIP: at 21:33

## 2020-12-10 RX ADMIN — HUMAN INSULIN SCH UNITS: 100 INJECTION, SOLUTION SUBCUTANEOUS at 18:07

## 2020-12-10 RX ADMIN — Medication SCH STRIP: at 06:38

## 2020-12-10 RX ADMIN — ZINC SULFATE CAP 220 MG (50 MG ELEMENTAL ZN) SCH MG: 220 (50 ZN) CAP at 12:03

## 2020-12-10 RX ADMIN — HUMAN INSULIN SCH UNITS: 100 INJECTION, SOLUTION SUBCUTANEOUS at 06:38

## 2020-12-10 RX ADMIN — Medication SCH STRIP: at 13:23

## 2020-12-10 RX ADMIN — Medication SCH ML: at 18:08

## 2020-12-10 RX ADMIN — NYSTATIN SCH ML: 500000 SUSPENSION ORAL at 12:03

## 2020-12-10 RX ADMIN — Medication SCH STRIP: at 18:06

## 2020-12-10 RX ADMIN — Medication SCH ML: at 08:00

## 2020-12-10 RX ADMIN — ENOXAPARIN SODIUM SCH MG: 60 INJECTION SUBCUTANEOUS at 12:03

## 2020-12-10 RX ADMIN — Medication SCH ML: at 12:00

## 2020-12-10 RX ADMIN — NYSTATIN SCH ML: 500000 SUSPENSION ORAL at 17:18

## 2020-12-10 RX ADMIN — Medication SCH UNIT: at 12:04

## 2020-12-10 RX ADMIN — BUDESONIDE SCH MCG: 180 AEROSOL, POWDER RESPIRATORY (INHALATION) at 19:48

## 2020-12-10 RX ADMIN — HUMAN INSULIN SCH UNITS: 100 INJECTION, SOLUTION SUBCUTANEOUS at 22:10

## 2020-12-11 VITALS — DIASTOLIC BLOOD PRESSURE: 60 MMHG | SYSTOLIC BLOOD PRESSURE: 105 MMHG

## 2020-12-11 VITALS — DIASTOLIC BLOOD PRESSURE: 51 MMHG | SYSTOLIC BLOOD PRESSURE: 110 MMHG

## 2020-12-11 VITALS — SYSTOLIC BLOOD PRESSURE: 132 MMHG | DIASTOLIC BLOOD PRESSURE: 67 MMHG

## 2020-12-11 VITALS — DIASTOLIC BLOOD PRESSURE: 64 MMHG | SYSTOLIC BLOOD PRESSURE: 110 MMHG

## 2020-12-11 VITALS — SYSTOLIC BLOOD PRESSURE: 117 MMHG | DIASTOLIC BLOOD PRESSURE: 71 MMHG

## 2020-12-11 LAB
ALBUMIN SERPL-MCNC: 2.2 G/DL (ref 3.4–5)
ALP SERPL-CCNC: 70 U/L (ref 45–117)
ALT SERPL-CCNC: 32 U/L (ref 13–56)
ANION GAP SERPL CALCULATED.3IONS-SCNC: 9 MMOL/L (ref 5–15)
BILIRUB SERPL-MCNC: 0.6 MG/DL (ref 0.2–1)
BUN SERPL-MCNC: 13 MG/DL (ref 7–18)
BUN/CREAT SERPL: 19.1
CALCIUM SERPL-MCNC: 8.4 MG/DL (ref 8.5–10.1)
CHLORIDE SERPL-SCNC: 103 MMOL/L (ref 98–107)
CO2 SERPL-SCNC: 28 MMOL/L (ref 21–32)
GLUCOSE SERPL-MCNC: 141 MG/DL (ref 74–106)
HCT VFR BLD AUTO: 39.5 % (ref 36–46)
HGB BLD-MCNC: 12.7 G/DL (ref 12.2–16.2)
MCH RBC QN AUTO: 24.3 PG (ref 28–32)
MCV RBC AUTO: 75.9 FL (ref 80–100)
NRBC BLD QL AUTO: 0.1 %
POTASSIUM SERPL-SCNC: 3.1 MMOL/L (ref 3.5–5.1)
PROT SERPL-MCNC: 6.8 G/DL (ref 6.4–8.2)
SODIUM SERPL-SCNC: 140 MMOL/L (ref 136–145)

## 2020-12-11 RX ADMIN — Medication SCH STRIP: at 11:55

## 2020-12-11 RX ADMIN — ALBUTEROL SULFATE PRN MCG: 108 AEROSOL, METERED RESPIRATORY (INHALATION) at 06:49

## 2020-12-11 RX ADMIN — Medication SCH ML: at 11:33

## 2020-12-11 RX ADMIN — HUMAN INSULIN SCH UNITS: 100 INJECTION, SOLUTION SUBCUTANEOUS at 21:40

## 2020-12-11 RX ADMIN — Medication SCH MG: at 09:48

## 2020-12-11 RX ADMIN — Medication SCH STRIP: at 06:01

## 2020-12-11 RX ADMIN — MONTELUKAST SODIUM SCH MG: 10 TABLET, FILM COATED ORAL at 21:39

## 2020-12-11 RX ADMIN — BUDESONIDE SCH MCG: 180 AEROSOL, POWDER RESPIRATORY (INHALATION) at 06:49

## 2020-12-11 RX ADMIN — ENOXAPARIN SODIUM SCH MG: 60 INJECTION SUBCUTANEOUS at 21:39

## 2020-12-11 RX ADMIN — FUROSEMIDE SCH MG: 10 INJECTION, SOLUTION INTRAMUSCULAR; INTRAVENOUS at 09:47

## 2020-12-11 RX ADMIN — NYSTATIN SCH ML: 500000 SUSPENSION ORAL at 21:38

## 2020-12-11 RX ADMIN — NYSTATIN SCH ML: 500000 SUSPENSION ORAL at 17:28

## 2020-12-11 RX ADMIN — Medication SCH ML: at 08:59

## 2020-12-11 RX ADMIN — ZINC SULFATE CAP 220 MG (50 MG ELEMENTAL ZN) SCH MG: 220 (50 ZN) CAP at 09:48

## 2020-12-11 RX ADMIN — Medication SCH UNIT: at 09:48

## 2020-12-11 RX ADMIN — POTASSIUM CHLORIDE SCH MEQ: 1500 TABLET, EXTENDED RELEASE ORAL at 09:48

## 2020-12-11 RX ADMIN — BUDESONIDE SCH MCG: 180 AEROSOL, POWDER RESPIRATORY (INHALATION) at 21:46

## 2020-12-11 RX ADMIN — Medication SCH STRIP: at 17:59

## 2020-12-11 RX ADMIN — HUMAN INSULIN SCH UNITS: 100 INJECTION, SOLUTION SUBCUTANEOUS at 17:58

## 2020-12-11 RX ADMIN — NYSTATIN SCH ML: 500000 SUSPENSION ORAL at 06:01

## 2020-12-11 RX ADMIN — FAMOTIDINE SCH MG: 20 TABLET, FILM COATED ORAL at 21:39

## 2020-12-11 RX ADMIN — NYSTATIN SCH ML: 500000 SUSPENSION ORAL at 11:32

## 2020-12-11 RX ADMIN — ENOXAPARIN SODIUM SCH MG: 60 INJECTION SUBCUTANEOUS at 09:49

## 2020-12-11 RX ADMIN — Medication SCH ML: at 17:29

## 2020-12-11 RX ADMIN — HUMAN INSULIN SCH UNITS: 100 INJECTION, SOLUTION SUBCUTANEOUS at 06:01

## 2020-12-11 RX ADMIN — HUMAN INSULIN SCH UNITS: 100 INJECTION, SOLUTION SUBCUTANEOUS at 11:55

## 2020-12-11 RX ADMIN — Medication SCH STRIP: at 20:46

## 2020-12-11 RX ADMIN — FAMOTIDINE SCH MG: 20 TABLET, FILM COATED ORAL at 09:48

## 2020-12-12 VITALS — SYSTOLIC BLOOD PRESSURE: 101 MMHG | DIASTOLIC BLOOD PRESSURE: 60 MMHG

## 2020-12-12 VITALS — SYSTOLIC BLOOD PRESSURE: 112 MMHG | DIASTOLIC BLOOD PRESSURE: 69 MMHG

## 2020-12-12 VITALS — SYSTOLIC BLOOD PRESSURE: 111 MMHG | DIASTOLIC BLOOD PRESSURE: 61 MMHG

## 2020-12-12 VITALS — SYSTOLIC BLOOD PRESSURE: 107 MMHG | DIASTOLIC BLOOD PRESSURE: 55 MMHG

## 2020-12-12 VITALS — DIASTOLIC BLOOD PRESSURE: 56 MMHG | SYSTOLIC BLOOD PRESSURE: 97 MMHG

## 2020-12-12 RX ADMIN — Medication SCH STRIP: at 17:14

## 2020-12-12 RX ADMIN — BUDESONIDE SCH MCG: 180 AEROSOL, POWDER RESPIRATORY (INHALATION) at 22:01

## 2020-12-12 RX ADMIN — ALBUTEROL SULFATE PRN MCG: 108 AEROSOL, METERED RESPIRATORY (INHALATION) at 07:14

## 2020-12-12 RX ADMIN — Medication SCH STRIP: at 22:40

## 2020-12-12 RX ADMIN — FAMOTIDINE SCH MG: 20 TABLET, FILM COATED ORAL at 22:48

## 2020-12-12 RX ADMIN — POTASSIUM CHLORIDE SCH MEQ: 1500 TABLET, EXTENDED RELEASE ORAL at 10:10

## 2020-12-12 RX ADMIN — Medication SCH ML: at 10:10

## 2020-12-12 RX ADMIN — Medication SCH ML: at 19:02

## 2020-12-12 RX ADMIN — HUMAN INSULIN SCH UNITS: 100 INJECTION, SOLUTION SUBCUTANEOUS at 22:47

## 2020-12-12 RX ADMIN — Medication SCH MG: at 10:11

## 2020-12-12 RX ADMIN — FAMOTIDINE SCH MG: 20 TABLET, FILM COATED ORAL at 10:10

## 2020-12-12 RX ADMIN — ENOXAPARIN SODIUM SCH MG: 60 INJECTION SUBCUTANEOUS at 10:10

## 2020-12-12 RX ADMIN — HUMAN INSULIN SCH UNITS: 100 INJECTION, SOLUTION SUBCUTANEOUS at 05:44

## 2020-12-12 RX ADMIN — Medication SCH STRIP: at 11:30

## 2020-12-12 RX ADMIN — NYSTATIN SCH ML: 500000 SUSPENSION ORAL at 22:48

## 2020-12-12 RX ADMIN — HUMAN INSULIN SCH UNITS: 100 INJECTION, SOLUTION SUBCUTANEOUS at 12:27

## 2020-12-12 RX ADMIN — NYSTATIN SCH ML: 500000 SUSPENSION ORAL at 12:49

## 2020-12-12 RX ADMIN — Medication SCH UNIT: at 10:11

## 2020-12-12 RX ADMIN — BUDESONIDE SCH MCG: 180 AEROSOL, POWDER RESPIRATORY (INHALATION) at 07:14

## 2020-12-12 RX ADMIN — FUROSEMIDE SCH MG: 10 INJECTION, SOLUTION INTRAMUSCULAR; INTRAVENOUS at 11:13

## 2020-12-12 RX ADMIN — MONTELUKAST SODIUM SCH MG: 10 TABLET, FILM COATED ORAL at 22:48

## 2020-12-12 RX ADMIN — ENOXAPARIN SODIUM SCH MG: 60 INJECTION SUBCUTANEOUS at 22:40

## 2020-12-12 RX ADMIN — Medication SCH STRIP: at 05:44

## 2020-12-12 RX ADMIN — ALBUTEROL SULFATE PRN MCG: 108 AEROSOL, METERED RESPIRATORY (INHALATION) at 01:25

## 2020-12-12 RX ADMIN — Medication SCH ML: at 12:49

## 2020-12-12 RX ADMIN — NYSTATIN SCH ML: 500000 SUSPENSION ORAL at 17:57

## 2020-12-12 RX ADMIN — HUMAN INSULIN SCH UNITS: 100 INJECTION, SOLUTION SUBCUTANEOUS at 17:19

## 2020-12-12 RX ADMIN — ALBUTEROL SULFATE PRN MCG: 108 AEROSOL, METERED RESPIRATORY (INHALATION) at 23:42

## 2020-12-12 RX ADMIN — NYSTATIN SCH ML: 500000 SUSPENSION ORAL at 05:52

## 2020-12-12 RX ADMIN — ZINC SULFATE CAP 220 MG (50 MG ELEMENTAL ZN) SCH MG: 220 (50 ZN) CAP at 10:11

## 2020-12-13 VITALS — DIASTOLIC BLOOD PRESSURE: 61 MMHG | SYSTOLIC BLOOD PRESSURE: 113 MMHG

## 2020-12-13 VITALS — DIASTOLIC BLOOD PRESSURE: 59 MMHG | SYSTOLIC BLOOD PRESSURE: 102 MMHG

## 2020-12-13 VITALS — SYSTOLIC BLOOD PRESSURE: 118 MMHG | DIASTOLIC BLOOD PRESSURE: 65 MMHG

## 2020-12-13 VITALS — DIASTOLIC BLOOD PRESSURE: 64 MMHG | SYSTOLIC BLOOD PRESSURE: 109 MMHG

## 2020-12-13 VITALS — SYSTOLIC BLOOD PRESSURE: 121 MMHG | DIASTOLIC BLOOD PRESSURE: 61 MMHG

## 2020-12-13 VITALS — DIASTOLIC BLOOD PRESSURE: 56 MMHG | SYSTOLIC BLOOD PRESSURE: 103 MMHG

## 2020-12-13 RX ADMIN — Medication SCH STRIP: at 17:43

## 2020-12-13 RX ADMIN — MONTELUKAST SODIUM SCH MG: 10 TABLET, FILM COATED ORAL at 21:46

## 2020-12-13 RX ADMIN — Medication SCH ML: at 17:44

## 2020-12-13 RX ADMIN — Medication SCH UNIT: at 09:28

## 2020-12-13 RX ADMIN — LORAZEPAM PRN MG: 2 INJECTION, SOLUTION INTRAMUSCULAR; INTRAVENOUS at 12:33

## 2020-12-13 RX ADMIN — FLUTICASONE PROPIONATE SCH MCG: 50 SPRAY, METERED NASAL at 21:45

## 2020-12-13 RX ADMIN — Medication SCH STRIP: at 21:46

## 2020-12-13 RX ADMIN — NYSTATIN SCH ML: 500000 SUSPENSION ORAL at 13:57

## 2020-12-13 RX ADMIN — BUDESONIDE SCH MCG: 180 AEROSOL, POWDER RESPIRATORY (INHALATION) at 22:06

## 2020-12-13 RX ADMIN — FUROSEMIDE SCH MG: 10 INJECTION, SOLUTION INTRAMUSCULAR; INTRAVENOUS at 09:26

## 2020-12-13 RX ADMIN — Medication SCH ML: at 13:07

## 2020-12-13 RX ADMIN — HUMAN INSULIN SCH UNITS: 100 INJECTION, SOLUTION SUBCUTANEOUS at 06:29

## 2020-12-13 RX ADMIN — ENOXAPARIN SODIUM SCH MG: 60 INJECTION SUBCUTANEOUS at 21:46

## 2020-12-13 RX ADMIN — ENOXAPARIN SODIUM SCH MG: 60 INJECTION SUBCUTANEOUS at 09:26

## 2020-12-13 RX ADMIN — FAMOTIDINE SCH MG: 20 TABLET, FILM COATED ORAL at 21:46

## 2020-12-13 RX ADMIN — POTASSIUM CHLORIDE SCH MEQ: 1500 TABLET, EXTENDED RELEASE ORAL at 09:27

## 2020-12-13 RX ADMIN — NYSTATIN SCH ML: 500000 SUSPENSION ORAL at 17:43

## 2020-12-13 RX ADMIN — HUMAN INSULIN SCH UNITS: 100 INJECTION, SOLUTION SUBCUTANEOUS at 13:07

## 2020-12-13 RX ADMIN — Medication SCH ML: at 09:27

## 2020-12-13 RX ADMIN — ALBUTEROL SULFATE PRN MCG: 108 AEROSOL, METERED RESPIRATORY (INHALATION) at 19:40

## 2020-12-13 RX ADMIN — ZINC SULFATE CAP 220 MG (50 MG ELEMENTAL ZN) SCH MG: 220 (50 ZN) CAP at 09:28

## 2020-12-13 RX ADMIN — FLUTICASONE PROPIONATE SCH MCG: 50 SPRAY, METERED NASAL at 00:54

## 2020-12-13 RX ADMIN — Medication SCH MG: at 09:27

## 2020-12-13 RX ADMIN — BUDESONIDE SCH MCG: 180 AEROSOL, POWDER RESPIRATORY (INHALATION) at 07:31

## 2020-12-13 RX ADMIN — NYSTATIN SCH ML: 500000 SUSPENSION ORAL at 21:46

## 2020-12-13 RX ADMIN — HUMAN INSULIN SCH UNITS: 100 INJECTION, SOLUTION SUBCUTANEOUS at 21:52

## 2020-12-13 RX ADMIN — FLUTICASONE PROPIONATE SCH MCG: 50 SPRAY, METERED NASAL at 09:28

## 2020-12-13 RX ADMIN — LORAZEPAM PRN MG: 2 INJECTION, SOLUTION INTRAMUSCULAR; INTRAVENOUS at 21:45

## 2020-12-13 RX ADMIN — HUMAN INSULIN SCH UNITS: 100 INJECTION, SOLUTION SUBCUTANEOUS at 17:43

## 2020-12-13 RX ADMIN — FAMOTIDINE SCH MG: 20 TABLET, FILM COATED ORAL at 09:27

## 2020-12-13 RX ADMIN — Medication SCH STRIP: at 06:24

## 2020-12-13 RX ADMIN — ALBUTEROL SULFATE PRN MCG: 108 AEROSOL, METERED RESPIRATORY (INHALATION) at 07:31

## 2020-12-13 RX ADMIN — Medication SCH STRIP: at 13:06

## 2020-12-13 RX ADMIN — NYSTATIN SCH ML: 500000 SUSPENSION ORAL at 06:24

## 2020-12-14 VITALS — SYSTOLIC BLOOD PRESSURE: 104 MMHG | DIASTOLIC BLOOD PRESSURE: 64 MMHG

## 2020-12-14 VITALS — SYSTOLIC BLOOD PRESSURE: 114 MMHG | DIASTOLIC BLOOD PRESSURE: 72 MMHG

## 2020-12-14 LAB
ANION GAP SERPL CALCULATED.3IONS-SCNC: 10 MMOL/L (ref 5–15)
BUN SERPL-MCNC: 19 MG/DL (ref 7–18)
BUN/CREAT SERPL: 21.3
CALCIUM SERPL-MCNC: 9.5 MG/DL (ref 8.5–10.1)
CHLORIDE SERPL-SCNC: 97 MMOL/L (ref 98–107)
CO2 SERPL-SCNC: 28 MMOL/L (ref 21–32)
GLUCOSE SERPL-MCNC: 144 MG/DL (ref 74–106)
HCT VFR BLD AUTO: 43.5 % (ref 36–46)
HGB BLD-MCNC: 14.1 G/DL (ref 12.2–16.2)
MCH RBC QN AUTO: 24.5 PG (ref 28–32)
MCV RBC AUTO: 75.4 FL (ref 80–100)
NRBC BLD QL AUTO: 0.2 %
POTASSIUM SERPL-SCNC: 3 MMOL/L (ref 3.5–5.1)
SODIUM SERPL-SCNC: 135 MMOL/L (ref 136–145)

## 2020-12-14 RX ADMIN — ZINC SULFATE CAP 220 MG (50 MG ELEMENTAL ZN) SCH MG: 220 (50 ZN) CAP at 10:00

## 2020-12-14 RX ADMIN — NYSTATIN SCH ML: 500000 SUSPENSION ORAL at 12:00

## 2020-12-14 RX ADMIN — ALBUTEROL SULFATE PRN MCG: 108 AEROSOL, METERED RESPIRATORY (INHALATION) at 06:58

## 2020-12-14 RX ADMIN — Medication SCH STRIP: at 11:30

## 2020-12-14 RX ADMIN — Medication SCH ML: at 08:03

## 2020-12-14 RX ADMIN — LORAZEPAM PRN MG: 2 INJECTION, SOLUTION INTRAMUSCULAR; INTRAVENOUS at 23:14

## 2020-12-14 RX ADMIN — HUMAN INSULIN SCH UNITS: 100 INJECTION, SOLUTION SUBCUTANEOUS at 06:29

## 2020-12-14 RX ADMIN — HUMAN INSULIN SCH UNITS: 100 INJECTION, SOLUTION SUBCUTANEOUS at 22:32

## 2020-12-14 RX ADMIN — ENOXAPARIN SODIUM SCH MG: 60 INJECTION SUBCUTANEOUS at 10:00

## 2020-12-14 RX ADMIN — Medication SCH STRIP: at 17:00

## 2020-12-14 RX ADMIN — HUMAN INSULIN SCH UNITS: 100 INJECTION, SOLUTION SUBCUTANEOUS at 17:00

## 2020-12-14 RX ADMIN — BUDESONIDE SCH MCG: 180 AEROSOL, POWDER RESPIRATORY (INHALATION) at 06:58

## 2020-12-14 RX ADMIN — ENOXAPARIN SODIUM SCH MG: 60 INJECTION SUBCUTANEOUS at 22:00

## 2020-12-14 RX ADMIN — BUDESONIDE SCH MCG: 180 AEROSOL, POWDER RESPIRATORY (INHALATION) at 21:09

## 2020-12-14 RX ADMIN — MONTELUKAST SODIUM SCH MG: 10 TABLET, FILM COATED ORAL at 22:00

## 2020-12-14 RX ADMIN — FAMOTIDINE SCH MG: 20 TABLET, FILM COATED ORAL at 22:15

## 2020-12-14 RX ADMIN — HUMAN INSULIN SCH UNITS: 100 INJECTION, SOLUTION SUBCUTANEOUS at 11:30

## 2020-12-14 RX ADMIN — FLUTICASONE PROPIONATE SCH MCG: 50 SPRAY, METERED NASAL at 22:16

## 2020-12-14 RX ADMIN — Medication SCH MG: at 10:00

## 2020-12-14 RX ADMIN — NYSTATIN SCH ML: 500000 SUSPENSION ORAL at 18:00

## 2020-12-14 RX ADMIN — POTASSIUM CHLORIDE SCH MEQ: 1500 TABLET, EXTENDED RELEASE ORAL at 10:00

## 2020-12-14 RX ADMIN — FUROSEMIDE SCH MG: 10 INJECTION, SOLUTION INTRAMUSCULAR; INTRAVENOUS at 10:00

## 2020-12-14 RX ADMIN — NYSTATIN SCH ML: 500000 SUSPENSION ORAL at 06:28

## 2020-12-14 RX ADMIN — FLUTICASONE PROPIONATE SCH MCG: 50 SPRAY, METERED NASAL at 10:00

## 2020-12-14 RX ADMIN — NYSTATIN SCH ML: 500000 SUSPENSION ORAL at 22:00

## 2020-12-14 RX ADMIN — FAMOTIDINE SCH MG: 20 TABLET, FILM COATED ORAL at 10:00

## 2020-12-14 RX ADMIN — Medication SCH STRIP: at 22:00

## 2020-12-14 RX ADMIN — Medication SCH ML: at 18:00

## 2020-12-14 RX ADMIN — Medication SCH STRIP: at 06:29

## 2020-12-14 RX ADMIN — Medication SCH ML: at 12:00

## 2020-12-14 RX ADMIN — Medication SCH UNIT: at 10:00

## 2020-12-15 VITALS — SYSTOLIC BLOOD PRESSURE: 123 MMHG | DIASTOLIC BLOOD PRESSURE: 74 MMHG

## 2020-12-15 VITALS — DIASTOLIC BLOOD PRESSURE: 52 MMHG | SYSTOLIC BLOOD PRESSURE: 96 MMHG

## 2020-12-15 VITALS — DIASTOLIC BLOOD PRESSURE: 62 MMHG | SYSTOLIC BLOOD PRESSURE: 110 MMHG

## 2020-12-15 VITALS — DIASTOLIC BLOOD PRESSURE: 80 MMHG | SYSTOLIC BLOOD PRESSURE: 132 MMHG

## 2020-12-15 VITALS — SYSTOLIC BLOOD PRESSURE: 122 MMHG | DIASTOLIC BLOOD PRESSURE: 89 MMHG

## 2020-12-15 LAB
ANION GAP SERPL CALCULATED.3IONS-SCNC: 10 MMOL/L (ref 5–15)
BUN SERPL-MCNC: 21 MG/DL (ref 7–18)
BUN/CREAT SERPL: 32.8
CALCIUM SERPL-MCNC: 9.3 MG/DL (ref 8.5–10.1)
CHLORIDE SERPL-SCNC: 98 MMOL/L (ref 98–107)
CO2 SERPL-SCNC: 29 MMOL/L (ref 21–32)
GLUCOSE SERPL-MCNC: 125 MG/DL (ref 74–106)
HCT VFR BLD AUTO: 43.3 % (ref 36–46)
HGB BLD-MCNC: 13.9 G/DL (ref 12.2–16.2)
MCH RBC QN AUTO: 24.3 PG (ref 28–32)
MCV RBC AUTO: 75.7 FL (ref 80–100)
NRBC BLD QL AUTO: 0.1 %
POTASSIUM SERPL-SCNC: 3.1 MMOL/L (ref 3.5–5.1)
SODIUM SERPL-SCNC: 137 MMOL/L (ref 136–145)

## 2020-12-15 RX ADMIN — NYSTATIN SCH ML: 500000 SUSPENSION ORAL at 05:47

## 2020-12-15 RX ADMIN — Medication SCH STRIP: at 18:06

## 2020-12-15 RX ADMIN — Medication SCH MG: at 11:04

## 2020-12-15 RX ADMIN — ZINC SULFATE CAP 220 MG (50 MG ELEMENTAL ZN) SCH MG: 220 (50 ZN) CAP at 11:03

## 2020-12-15 RX ADMIN — Medication SCH STRIP: at 11:30

## 2020-12-15 RX ADMIN — NYSTATIN SCH ML: 500000 SUSPENSION ORAL at 21:58

## 2020-12-15 RX ADMIN — FLUTICASONE PROPIONATE SCH MCG: 50 SPRAY, METERED NASAL at 21:57

## 2020-12-15 RX ADMIN — NYSTATIN SCH ML: 500000 SUSPENSION ORAL at 13:04

## 2020-12-15 RX ADMIN — Medication SCH ML: at 18:05

## 2020-12-15 RX ADMIN — MONTELUKAST SODIUM SCH MG: 10 TABLET, FILM COATED ORAL at 21:58

## 2020-12-15 RX ADMIN — ENOXAPARIN SODIUM SCH MG: 60 INJECTION SUBCUTANEOUS at 21:58

## 2020-12-15 RX ADMIN — POTASSIUM CHLORIDE SCH MEQ: 1500 TABLET, EXTENDED RELEASE ORAL at 11:03

## 2020-12-15 RX ADMIN — BUDESONIDE SCH MCG: 180 AEROSOL, POWDER RESPIRATORY (INHALATION) at 06:22

## 2020-12-15 RX ADMIN — ALBUTEROL SULFATE PRN MCG: 108 AEROSOL, METERED RESPIRATORY (INHALATION) at 06:22

## 2020-12-15 RX ADMIN — FAMOTIDINE SCH MG: 20 TABLET, FILM COATED ORAL at 21:58

## 2020-12-15 RX ADMIN — NYSTATIN SCH ML: 500000 SUSPENSION ORAL at 18:05

## 2020-12-15 RX ADMIN — Medication SCH ML: at 13:04

## 2020-12-15 RX ADMIN — BUDESONIDE SCH MCG: 180 AEROSOL, POWDER RESPIRATORY (INHALATION) at 20:22

## 2020-12-15 RX ADMIN — HUMAN INSULIN SCH UNITS: 100 INJECTION, SOLUTION SUBCUTANEOUS at 12:00

## 2020-12-15 RX ADMIN — ALBUTEROL SULFATE PRN MCG: 108 AEROSOL, METERED RESPIRATORY (INHALATION) at 20:22

## 2020-12-15 RX ADMIN — HUMAN INSULIN SCH UNITS: 100 INJECTION, SOLUTION SUBCUTANEOUS at 18:07

## 2020-12-15 RX ADMIN — Medication SCH ML: at 08:00

## 2020-12-15 RX ADMIN — HUMAN INSULIN SCH UNITS: 100 INJECTION, SOLUTION SUBCUTANEOUS at 06:24

## 2020-12-15 RX ADMIN — HUMAN INSULIN SCH UNITS: 100 INJECTION, SOLUTION SUBCUTANEOUS at 22:13

## 2020-12-15 RX ADMIN — FUROSEMIDE SCH MG: 10 INJECTION, SOLUTION INTRAMUSCULAR; INTRAVENOUS at 11:03

## 2020-12-15 RX ADMIN — ENOXAPARIN SODIUM SCH MG: 60 INJECTION SUBCUTANEOUS at 11:04

## 2020-12-15 RX ADMIN — Medication SCH STRIP: at 06:24

## 2020-12-15 RX ADMIN — FAMOTIDINE SCH MG: 20 TABLET, FILM COATED ORAL at 11:03

## 2020-12-15 RX ADMIN — Medication SCH UNIT: at 11:04

## 2020-12-15 RX ADMIN — Medication SCH STRIP: at 21:58

## 2020-12-15 RX ADMIN — FLUTICASONE PROPIONATE SCH MCG: 50 SPRAY, METERED NASAL at 11:02

## 2020-12-16 VITALS — SYSTOLIC BLOOD PRESSURE: 115 MMHG | DIASTOLIC BLOOD PRESSURE: 78 MMHG

## 2020-12-16 VITALS — DIASTOLIC BLOOD PRESSURE: 67 MMHG | SYSTOLIC BLOOD PRESSURE: 120 MMHG

## 2020-12-16 VITALS — SYSTOLIC BLOOD PRESSURE: 105 MMHG | DIASTOLIC BLOOD PRESSURE: 79 MMHG

## 2020-12-16 VITALS — DIASTOLIC BLOOD PRESSURE: 64 MMHG | SYSTOLIC BLOOD PRESSURE: 92 MMHG

## 2020-12-16 VITALS — SYSTOLIC BLOOD PRESSURE: 122 MMHG | DIASTOLIC BLOOD PRESSURE: 94 MMHG

## 2020-12-16 LAB
ANION GAP SERPL CALCULATED.3IONS-SCNC: 8 MMOL/L (ref 5–15)
BUN SERPL-MCNC: 19 MG/DL (ref 7–18)
BUN/CREAT SERPL: 28.4
CALCIUM SERPL-MCNC: 8.9 MG/DL (ref 8.5–10.1)
CHLORIDE SERPL-SCNC: 98 MMOL/L (ref 98–107)
CO2 SERPL-SCNC: 31 MMOL/L (ref 21–32)
GLUCOSE SERPL-MCNC: 121 MG/DL (ref 74–106)
HCT VFR BLD AUTO: 44.2 % (ref 36–46)
HGB BLD-MCNC: 14.5 G/DL (ref 12.2–16.2)
MCH RBC QN AUTO: 24.8 PG (ref 28–32)
MCV RBC AUTO: 75.7 FL (ref 80–100)
NRBC BLD QL AUTO: 0.1 %
POTASSIUM SERPL-SCNC: 3.8 MMOL/L (ref 3.5–5.1)
SODIUM SERPL-SCNC: 137 MMOL/L (ref 136–145)

## 2020-12-16 RX ADMIN — NYSTATIN SCH ML: 500000 SUSPENSION ORAL at 07:00

## 2020-12-16 RX ADMIN — LORAZEPAM PRN MG: 2 INJECTION, SOLUTION INTRAMUSCULAR; INTRAVENOUS at 21:42

## 2020-12-16 RX ADMIN — Medication SCH STRIP: at 07:00

## 2020-12-16 RX ADMIN — FUROSEMIDE SCH MG: 10 INJECTION, SOLUTION INTRAMUSCULAR; INTRAVENOUS at 10:14

## 2020-12-16 RX ADMIN — FAMOTIDINE SCH MG: 20 TABLET, FILM COATED ORAL at 10:17

## 2020-12-16 RX ADMIN — Medication SCH STRIP: at 17:15

## 2020-12-16 RX ADMIN — ALBUTEROL SULFATE PRN MCG: 108 AEROSOL, METERED RESPIRATORY (INHALATION) at 23:57

## 2020-12-16 RX ADMIN — Medication SCH MG: at 10:18

## 2020-12-16 RX ADMIN — PIPERACILLIN SODIUM AND TAZOBACTAM SODIUM SCH MLS/HR: .375; 3 INJECTION, POWDER, LYOPHILIZED, FOR SOLUTION INTRAVENOUS at 21:40

## 2020-12-16 RX ADMIN — Medication SCH STRIP: at 21:41

## 2020-12-16 RX ADMIN — BUDESONIDE SCH MCG: 180 AEROSOL, POWDER RESPIRATORY (INHALATION) at 08:41

## 2020-12-16 RX ADMIN — HUMAN INSULIN SCH UNITS: 100 INJECTION, SOLUTION SUBCUTANEOUS at 11:30

## 2020-12-16 RX ADMIN — NYSTATIN SCH ML: 500000 SUSPENSION ORAL at 21:39

## 2020-12-16 RX ADMIN — Medication SCH ML: at 17:16

## 2020-12-16 RX ADMIN — BUDESONIDE SCH MCG: 180 AEROSOL, POWDER RESPIRATORY (INHALATION) at 23:57

## 2020-12-16 RX ADMIN — MONTELUKAST SODIUM SCH MG: 10 TABLET, FILM COATED ORAL at 21:39

## 2020-12-16 RX ADMIN — LORAZEPAM PRN MG: 2 INJECTION, SOLUTION INTRAMUSCULAR; INTRAVENOUS at 01:11

## 2020-12-16 RX ADMIN — FLUTICASONE PROPIONATE SCH MCG: 50 SPRAY, METERED NASAL at 10:56

## 2020-12-16 RX ADMIN — ENOXAPARIN SODIUM SCH MG: 60 INJECTION SUBCUTANEOUS at 21:41

## 2020-12-16 RX ADMIN — Medication SCH UNIT: at 10:18

## 2020-12-16 RX ADMIN — Medication SCH ML: at 08:00

## 2020-12-16 RX ADMIN — FAMOTIDINE SCH MG: 20 TABLET, FILM COATED ORAL at 21:39

## 2020-12-16 RX ADMIN — ALBUTEROL SULFATE PRN MCG: 108 AEROSOL, METERED RESPIRATORY (INHALATION) at 08:41

## 2020-12-16 RX ADMIN — HUMAN INSULIN SCH UNITS: 100 INJECTION, SOLUTION SUBCUTANEOUS at 07:00

## 2020-12-16 RX ADMIN — ZINC SULFATE CAP 220 MG (50 MG ELEMENTAL ZN) SCH MG: 220 (50 ZN) CAP at 10:15

## 2020-12-16 RX ADMIN — NYSTATIN SCH ML: 500000 SUSPENSION ORAL at 12:33

## 2020-12-16 RX ADMIN — VANCOMYCIN HYDROCHLORIDE SCH MLS/HR: 1 INJECTION, POWDER, LYOPHILIZED, FOR SOLUTION INTRAVENOUS at 17:20

## 2020-12-16 RX ADMIN — HUMAN INSULIN SCH UNITS: 100 INJECTION, SOLUTION SUBCUTANEOUS at 21:37

## 2020-12-16 RX ADMIN — FLUTICASONE PROPIONATE SCH MCG: 50 SPRAY, METERED NASAL at 22:00

## 2020-12-16 RX ADMIN — ENOXAPARIN SODIUM SCH MG: 60 INJECTION SUBCUTANEOUS at 10:18

## 2020-12-16 RX ADMIN — POTASSIUM CHLORIDE SCH MEQ: 1500 TABLET, EXTENDED RELEASE ORAL at 10:17

## 2020-12-16 RX ADMIN — HUMAN INSULIN SCH UNITS: 100 INJECTION, SOLUTION SUBCUTANEOUS at 17:24

## 2020-12-16 RX ADMIN — Medication SCH ML: at 12:00

## 2020-12-16 RX ADMIN — PIPERACILLIN SODIUM AND TAZOBACTAM SODIUM SCH MLS/HR: .375; 3 INJECTION, POWDER, LYOPHILIZED, FOR SOLUTION INTRAVENOUS at 14:31

## 2020-12-16 RX ADMIN — Medication SCH STRIP: at 11:30

## 2020-12-16 RX ADMIN — NYSTATIN SCH ML: 500000 SUSPENSION ORAL at 17:21

## 2020-12-17 VITALS — DIASTOLIC BLOOD PRESSURE: 61 MMHG | SYSTOLIC BLOOD PRESSURE: 96 MMHG

## 2020-12-17 VITALS — DIASTOLIC BLOOD PRESSURE: 72 MMHG | SYSTOLIC BLOOD PRESSURE: 119 MMHG

## 2020-12-17 VITALS — SYSTOLIC BLOOD PRESSURE: 112 MMHG | DIASTOLIC BLOOD PRESSURE: 79 MMHG

## 2020-12-17 VITALS — SYSTOLIC BLOOD PRESSURE: 117 MMHG | DIASTOLIC BLOOD PRESSURE: 72 MMHG

## 2020-12-17 VITALS — SYSTOLIC BLOOD PRESSURE: 118 MMHG | DIASTOLIC BLOOD PRESSURE: 72 MMHG

## 2020-12-17 LAB
HCT VFR BLD AUTO: 39.9 % (ref 36–46)
HGB BLD-MCNC: 12.8 G/DL (ref 12.2–16.2)
MCH RBC QN AUTO: 24.3 PG (ref 28–32)
MCV RBC AUTO: 76.1 FL (ref 80–100)
NRBC BLD QL AUTO: 0.1 %

## 2020-12-17 RX ADMIN — Medication SCH STRIP: at 22:17

## 2020-12-17 RX ADMIN — PIPERACILLIN SODIUM AND TAZOBACTAM SODIUM SCH MLS/HR: .375; 3 INJECTION, POWDER, LYOPHILIZED, FOR SOLUTION INTRAVENOUS at 06:51

## 2020-12-17 RX ADMIN — HUMAN INSULIN SCH UNITS: 100 INJECTION, SOLUTION SUBCUTANEOUS at 22:00

## 2020-12-17 RX ADMIN — POTASSIUM CHLORIDE SCH MEQ: 1500 TABLET, EXTENDED RELEASE ORAL at 22:15

## 2020-12-17 RX ADMIN — FUROSEMIDE SCH MG: 10 INJECTION, SOLUTION INTRAMUSCULAR; INTRAVENOUS at 22:34

## 2020-12-17 RX ADMIN — NYSTATIN SCH ML: 500000 SUSPENSION ORAL at 17:45

## 2020-12-17 RX ADMIN — NYSTATIN SCH ML: 500000 SUSPENSION ORAL at 22:16

## 2020-12-17 RX ADMIN — Medication SCH ML: at 17:45

## 2020-12-17 RX ADMIN — FAMOTIDINE SCH MG: 20 TABLET, FILM COATED ORAL at 22:00

## 2020-12-17 RX ADMIN — Medication SCH STRIP: at 17:00

## 2020-12-17 RX ADMIN — FAMOTIDINE SCH MG: 20 TABLET, FILM COATED ORAL at 09:21

## 2020-12-17 RX ADMIN — FLUTICASONE PROPIONATE SCH MCG: 50 SPRAY, METERED NASAL at 22:27

## 2020-12-17 RX ADMIN — ALBUTEROL SULFATE PRN MCG: 108 AEROSOL, METERED RESPIRATORY (INHALATION) at 15:36

## 2020-12-17 RX ADMIN — ENOXAPARIN SODIUM SCH MG: 60 INJECTION SUBCUTANEOUS at 09:21

## 2020-12-17 RX ADMIN — Medication SCH ML: at 08:00

## 2020-12-17 RX ADMIN — Medication SCH STRIP: at 06:45

## 2020-12-17 RX ADMIN — NYSTATIN SCH ML: 500000 SUSPENSION ORAL at 12:51

## 2020-12-17 RX ADMIN — FLUTICASONE PROPIONATE SCH MCG: 50 SPRAY, METERED NASAL at 10:00

## 2020-12-17 RX ADMIN — HUMAN INSULIN SCH UNITS: 100 INJECTION, SOLUTION SUBCUTANEOUS at 13:12

## 2020-12-17 RX ADMIN — FUROSEMIDE SCH MG: 10 INJECTION, SOLUTION INTRAMUSCULAR; INTRAVENOUS at 09:20

## 2020-12-17 RX ADMIN — MONTELUKAST SODIUM SCH MG: 10 TABLET, FILM COATED ORAL at 22:14

## 2020-12-17 RX ADMIN — VANCOMYCIN HYDROCHLORIDE SCH MLS/HR: 1 INJECTION, POWDER, LYOPHILIZED, FOR SOLUTION INTRAVENOUS at 17:46

## 2020-12-17 RX ADMIN — ZINC SULFATE CAP 220 MG (50 MG ELEMENTAL ZN) SCH MG: 220 (50 ZN) CAP at 09:20

## 2020-12-17 RX ADMIN — LORAZEPAM PRN MG: 2 INJECTION, SOLUTION INTRAMUSCULAR; INTRAVENOUS at 22:20

## 2020-12-17 RX ADMIN — NYSTATIN SCH ML: 500000 SUSPENSION ORAL at 06:51

## 2020-12-17 RX ADMIN — PIPERACILLIN SODIUM AND TAZOBACTAM SODIUM SCH MLS/HR: .375; 3 INJECTION, POWDER, LYOPHILIZED, FOR SOLUTION INTRAVENOUS at 22:16

## 2020-12-17 RX ADMIN — POTASSIUM CHLORIDE SCH MEQ: 1500 TABLET, EXTENDED RELEASE ORAL at 09:20

## 2020-12-17 RX ADMIN — ENOXAPARIN SODIUM SCH MG: 60 INJECTION SUBCUTANEOUS at 22:17

## 2020-12-17 RX ADMIN — HUMAN INSULIN SCH UNITS: 100 INJECTION, SOLUTION SUBCUTANEOUS at 18:01

## 2020-12-17 RX ADMIN — PIPERACILLIN SODIUM AND TAZOBACTAM SODIUM SCH MLS/HR: .375; 3 INJECTION, POWDER, LYOPHILIZED, FOR SOLUTION INTRAVENOUS at 14:22

## 2020-12-17 RX ADMIN — HUMAN INSULIN SCH UNITS: 100 INJECTION, SOLUTION SUBCUTANEOUS at 06:52

## 2020-12-17 RX ADMIN — ALBUTEROL SULFATE PRN MCG: 108 AEROSOL, METERED RESPIRATORY (INHALATION) at 18:50

## 2020-12-17 RX ADMIN — BUDESONIDE SCH MCG: 180 AEROSOL, POWDER RESPIRATORY (INHALATION) at 10:00

## 2020-12-17 RX ADMIN — Medication SCH STRIP: at 11:30

## 2020-12-17 RX ADMIN — FLUCONAZOLE IN SODIUM CHLORIDE SCH MLS/HR: 2 INJECTION, SOLUTION INTRAVENOUS at 09:20

## 2020-12-17 RX ADMIN — Medication SCH MG: at 09:21

## 2020-12-17 RX ADMIN — Medication SCH ML: at 12:00

## 2020-12-17 RX ADMIN — VANCOMYCIN HYDROCHLORIDE SCH MLS/HR: 1 INJECTION, POWDER, LYOPHILIZED, FOR SOLUTION INTRAVENOUS at 05:19

## 2020-12-17 RX ADMIN — Medication SCH UNIT: at 09:21

## 2020-12-17 RX ADMIN — BUDESONIDE SCH MCG: 180 AEROSOL, POWDER RESPIRATORY (INHALATION) at 18:35

## 2020-12-18 VITALS — SYSTOLIC BLOOD PRESSURE: 96 MMHG | DIASTOLIC BLOOD PRESSURE: 59 MMHG

## 2020-12-18 VITALS — SYSTOLIC BLOOD PRESSURE: 107 MMHG | DIASTOLIC BLOOD PRESSURE: 64 MMHG

## 2020-12-18 VITALS — DIASTOLIC BLOOD PRESSURE: 69 MMHG | SYSTOLIC BLOOD PRESSURE: 105 MMHG

## 2020-12-18 VITALS — SYSTOLIC BLOOD PRESSURE: 114 MMHG | DIASTOLIC BLOOD PRESSURE: 75 MMHG

## 2020-12-18 VITALS — DIASTOLIC BLOOD PRESSURE: 53 MMHG | SYSTOLIC BLOOD PRESSURE: 104 MMHG

## 2020-12-18 LAB
ANION GAP SERPL CALCULATED.3IONS-SCNC: 7 MMOL/L (ref 5–15)
BUN SERPL-MCNC: 16 MG/DL (ref 7–18)
BUN/CREAT SERPL: 21.1
CALCIUM SERPL-MCNC: 9.2 MG/DL (ref 8.5–10.1)
CHLORIDE SERPL-SCNC: 100 MMOL/L (ref 98–107)
CO2 SERPL-SCNC: 28 MMOL/L (ref 21–32)
GLUCOSE SERPL-MCNC: 130 MG/DL (ref 74–106)
HCT VFR BLD AUTO: 41.9 % (ref 36–46)
HGB BLD-MCNC: 13.7 G/DL (ref 12.2–16.2)
MCH RBC QN AUTO: 24.8 PG (ref 28–32)
MCV RBC AUTO: 76 FL (ref 80–100)
NRBC BLD QL AUTO: 0.1 %
POTASSIUM SERPL-SCNC: 3.9 MMOL/L (ref 3.5–5.1)
SODIUM SERPL-SCNC: 135 MMOL/L (ref 136–145)

## 2020-12-18 RX ADMIN — ZINC SULFATE CAP 220 MG (50 MG ELEMENTAL ZN) SCH MG: 220 (50 ZN) CAP at 12:00

## 2020-12-18 RX ADMIN — Medication SCH MG: at 11:26

## 2020-12-18 RX ADMIN — NYSTATIN SCH ML: 500000 SUSPENSION ORAL at 18:15

## 2020-12-18 RX ADMIN — POTASSIUM CHLORIDE SCH MEQ: 1500 TABLET, EXTENDED RELEASE ORAL at 22:25

## 2020-12-18 RX ADMIN — ENOXAPARIN SODIUM SCH MG: 60 INJECTION SUBCUTANEOUS at 11:27

## 2020-12-18 RX ADMIN — Medication SCH STRIP: at 23:23

## 2020-12-18 RX ADMIN — Medication SCH ML: at 12:00

## 2020-12-18 RX ADMIN — ENOXAPARIN SODIUM SCH MG: 60 INJECTION SUBCUTANEOUS at 22:27

## 2020-12-18 RX ADMIN — NYSTATIN SCH ML: 500000 SUSPENSION ORAL at 12:00

## 2020-12-18 RX ADMIN — LEUCINE, PHENYLALANINE, LYSINE, METHIONINE, ISOLEUCINE, VALINE, HISTIDINE, THREONINE, TRYPTOPHAN, ALANINE, GLYCINE, ARGININE, PROLINE, SERINE, TYROSINE, DEXTROSE NR MLS/HR: 311; 238; 247; 170; 255; 247; 204; 179; 77; 880; 438; 489; 289; 213; 17; 10 INJECTION INTRAVENOUS at 20:51

## 2020-12-18 RX ADMIN — BUDESONIDE SCH MCG: 180 AEROSOL, POWDER RESPIRATORY (INHALATION) at 18:52

## 2020-12-18 RX ADMIN — FLUTICASONE PROPIONATE SCH MCG: 50 SPRAY, METERED NASAL at 22:29

## 2020-12-18 RX ADMIN — Medication SCH UNIT: at 11:26

## 2020-12-18 RX ADMIN — PIPERACILLIN SODIUM AND TAZOBACTAM SODIUM SCH MLS/HR: .375; 3 INJECTION, POWDER, LYOPHILIZED, FOR SOLUTION INTRAVENOUS at 05:31

## 2020-12-18 RX ADMIN — Medication SCH ML: at 08:00

## 2020-12-18 RX ADMIN — MONTELUKAST SODIUM SCH MG: 10 TABLET, FILM COATED ORAL at 22:25

## 2020-12-18 RX ADMIN — POTASSIUM CHLORIDE SCH MEQ: 1500 TABLET, EXTENDED RELEASE ORAL at 12:00

## 2020-12-18 RX ADMIN — HUMAN INSULIN SCH UNITS: 100 INJECTION, SOLUTION SUBCUTANEOUS at 23:26

## 2020-12-18 RX ADMIN — Medication SCH ML: at 18:00

## 2020-12-18 RX ADMIN — VANCOMYCIN HYDROCHLORIDE SCH MLS/HR: 1 INJECTION, POWDER, LYOPHILIZED, FOR SOLUTION INTRAVENOUS at 18:45

## 2020-12-18 RX ADMIN — LORAZEPAM PRN MG: 2 INJECTION, SOLUTION INTRAMUSCULAR; INTRAVENOUS at 22:51

## 2020-12-18 RX ADMIN — VANCOMYCIN HYDROCHLORIDE SCH MLS/HR: 1 INJECTION, POWDER, LYOPHILIZED, FOR SOLUTION INTRAVENOUS at 07:42

## 2020-12-18 RX ADMIN — Medication SCH STRIP: at 07:14

## 2020-12-18 RX ADMIN — FLUTICASONE PROPIONATE SCH MCG: 50 SPRAY, METERED NASAL at 10:30

## 2020-12-18 RX ADMIN — NYSTATIN SCH ML: 500000 SUSPENSION ORAL at 22:26

## 2020-12-18 RX ADMIN — FAMOTIDINE SCH MG: 20 TABLET, FILM COATED ORAL at 11:26

## 2020-12-18 RX ADMIN — HUMAN INSULIN SCH UNITS: 100 INJECTION, SOLUTION SUBCUTANEOUS at 07:00

## 2020-12-18 RX ADMIN — FLUCONAZOLE IN SODIUM CHLORIDE SCH MLS/HR: 2 INJECTION, SOLUTION INTRAVENOUS at 11:27

## 2020-12-18 RX ADMIN — HUMAN INSULIN SCH UNITS: 100 INJECTION, SOLUTION SUBCUTANEOUS at 11:30

## 2020-12-18 RX ADMIN — PIPERACILLIN SODIUM AND TAZOBACTAM SODIUM SCH MLS/HR: .375; 3 INJECTION, POWDER, LYOPHILIZED, FOR SOLUTION INTRAVENOUS at 15:30

## 2020-12-18 RX ADMIN — Medication SCH STRIP: at 11:30

## 2020-12-18 RX ADMIN — FAMOTIDINE SCH MG: 20 TABLET, FILM COATED ORAL at 22:25

## 2020-12-18 RX ADMIN — BUDESONIDE SCH MCG: 180 AEROSOL, POWDER RESPIRATORY (INHALATION) at 08:25

## 2020-12-18 RX ADMIN — NYSTATIN SCH ML: 500000 SUSPENSION ORAL at 05:31

## 2020-12-18 RX ADMIN — FUROSEMIDE SCH MG: 10 INJECTION, SOLUTION INTRAMUSCULAR; INTRAVENOUS at 05:35

## 2020-12-18 RX ADMIN — ALBUTEROL SULFATE PRN MCG: 108 AEROSOL, METERED RESPIRATORY (INHALATION) at 08:25

## 2020-12-18 RX ADMIN — FUROSEMIDE SCH MG: 10 INJECTION, SOLUTION INTRAMUSCULAR; INTRAVENOUS at 18:15

## 2020-12-18 RX ADMIN — PIPERACILLIN SODIUM AND TAZOBACTAM SODIUM SCH MLS/HR: .375; 3 INJECTION, POWDER, LYOPHILIZED, FOR SOLUTION INTRAVENOUS at 22:28

## 2020-12-19 VITALS — DIASTOLIC BLOOD PRESSURE: 62 MMHG | SYSTOLIC BLOOD PRESSURE: 113 MMHG

## 2020-12-19 VITALS — DIASTOLIC BLOOD PRESSURE: 73 MMHG | SYSTOLIC BLOOD PRESSURE: 105 MMHG

## 2020-12-19 VITALS — SYSTOLIC BLOOD PRESSURE: 106 MMHG | DIASTOLIC BLOOD PRESSURE: 73 MMHG

## 2020-12-19 VITALS — DIASTOLIC BLOOD PRESSURE: 72 MMHG | SYSTOLIC BLOOD PRESSURE: 116 MMHG

## 2020-12-19 VITALS — SYSTOLIC BLOOD PRESSURE: 105 MMHG | DIASTOLIC BLOOD PRESSURE: 64 MMHG

## 2020-12-19 LAB
ALBUMIN SERPL-MCNC: 2.8 G/DL (ref 3.4–5)
ALP SERPL-CCNC: 69 U/L (ref 45–117)
ALT SERPL-CCNC: 48 U/L (ref 13–56)
ANION GAP SERPL CALCULATED.3IONS-SCNC: 10 MMOL/L (ref 5–15)
BILIRUB SERPL-MCNC: 0.5 MG/DL (ref 0.2–1)
BUN SERPL-MCNC: 17 MG/DL (ref 7–18)
BUN/CREAT SERPL: 21.5
CALCIUM SERPL-MCNC: 8.8 MG/DL (ref 8.5–10.1)
CHLORIDE SERPL-SCNC: 97 MMOL/L (ref 98–107)
CO2 SERPL-SCNC: 26 MMOL/L (ref 21–32)
GLUCOSE SERPL-MCNC: 144 MG/DL (ref 74–106)
HCT VFR BLD AUTO: 42.2 % (ref 36–46)
HGB BLD-MCNC: 13.9 G/DL (ref 12.2–16.2)
MAGNESIUM SERPL-MCNC: 2.6 MG/DL (ref 1.6–2.6)
MCH RBC QN AUTO: 25.1 PG (ref 28–32)
MCV RBC AUTO: 76.2 FL (ref 80–100)
NRBC BLD QL AUTO: 0.2 %
POTASSIUM SERPL-SCNC: 3.5 MMOL/L (ref 3.5–5.1)
PROT SERPL-MCNC: 7.2 G/DL (ref 6.4–8.2)
SODIUM SERPL-SCNC: 133 MMOL/L (ref 136–145)
TRIGL SERPL-MCNC: 247 MG/DL (ref ?–150)

## 2020-12-19 RX ADMIN — POTASSIUM CHLORIDE SCH MEQ: 1500 TABLET, EXTENDED RELEASE ORAL at 09:44

## 2020-12-19 RX ADMIN — VANCOMYCIN HYDROCHLORIDE SCH MLS/HR: 1 INJECTION, POWDER, LYOPHILIZED, FOR SOLUTION INTRAVENOUS at 14:16

## 2020-12-19 RX ADMIN — ENOXAPARIN SODIUM SCH MG: 60 INJECTION SUBCUTANEOUS at 22:28

## 2020-12-19 RX ADMIN — LEUCINE, PHENYLALANINE, LYSINE, METHIONINE, ISOLEUCINE, VALINE, HISTIDINE, THREONINE, TRYPTOPHAN, ALANINE, GLYCINE, ARGININE, PROLINE, SERINE, TYROSINE, DEXTROSE NR MLS/HR: 311; 238; 247; 170; 255; 247; 204; 179; 77; 880; 438; 489; 289; 213; 17; 10 INJECTION INTRAVENOUS at 16:00

## 2020-12-19 RX ADMIN — ZINC SULFATE CAP 220 MG (50 MG ELEMENTAL ZN) SCH MG: 220 (50 ZN) CAP at 09:43

## 2020-12-19 RX ADMIN — HUMAN INSULIN SCH UNITS: 100 INJECTION, SOLUTION SUBCUTANEOUS at 17:31

## 2020-12-19 RX ADMIN — FUROSEMIDE SCH MG: 10 INJECTION, SOLUTION INTRAMUSCULAR; INTRAVENOUS at 05:35

## 2020-12-19 RX ADMIN — Medication SCH ML: at 17:30

## 2020-12-19 RX ADMIN — PIPERACILLIN SODIUM AND TAZOBACTAM SODIUM SCH MLS/HR: .375; 3 INJECTION, POWDER, LYOPHILIZED, FOR SOLUTION INTRAVENOUS at 22:27

## 2020-12-19 RX ADMIN — Medication SCH UNIT: at 09:45

## 2020-12-19 RX ADMIN — VANCOMYCIN HYDROCHLORIDE SCH MLS/HR: 1 INJECTION, POWDER, LYOPHILIZED, FOR SOLUTION INTRAVENOUS at 23:41

## 2020-12-19 RX ADMIN — PIPERACILLIN SODIUM AND TAZOBACTAM SODIUM SCH MLS/HR: .375; 3 INJECTION, POWDER, LYOPHILIZED, FOR SOLUTION INTRAVENOUS at 14:16

## 2020-12-19 RX ADMIN — NYSTATIN SCH ML: 500000 SUSPENSION ORAL at 22:27

## 2020-12-19 RX ADMIN — FLUTICASONE PROPIONATE SCH MCG: 50 SPRAY, METERED NASAL at 09:43

## 2020-12-19 RX ADMIN — HUMAN INSULIN SCH UNITS: 100 INJECTION, SOLUTION SUBCUTANEOUS at 11:44

## 2020-12-19 RX ADMIN — Medication SCH ML: at 11:42

## 2020-12-19 RX ADMIN — Medication SCH STRIP: at 11:42

## 2020-12-19 RX ADMIN — Medication SCH STRIP: at 17:30

## 2020-12-19 RX ADMIN — NYSTATIN SCH ML: 500000 SUSPENSION ORAL at 05:25

## 2020-12-19 RX ADMIN — HUMAN INSULIN SCH UNITS: 100 INJECTION, SOLUTION SUBCUTANEOUS at 23:54

## 2020-12-19 RX ADMIN — Medication SCH STRIP: at 23:49

## 2020-12-19 RX ADMIN — ENOXAPARIN SODIUM SCH MG: 60 INJECTION SUBCUTANEOUS at 09:45

## 2020-12-19 RX ADMIN — BUDESONIDE SCH MCG: 180 AEROSOL, POWDER RESPIRATORY (INHALATION) at 19:19

## 2020-12-19 RX ADMIN — Medication SCH MG: at 09:45

## 2020-12-19 RX ADMIN — PIPERACILLIN SODIUM AND TAZOBACTAM SODIUM SCH MLS/HR: .375; 3 INJECTION, POWDER, LYOPHILIZED, FOR SOLUTION INTRAVENOUS at 05:26

## 2020-12-19 RX ADMIN — HUMAN INSULIN SCH UNITS: 100 INJECTION, SOLUTION SUBCUTANEOUS at 05:40

## 2020-12-19 RX ADMIN — BUDESONIDE SCH MCG: 180 AEROSOL, POWDER RESPIRATORY (INHALATION) at 10:00

## 2020-12-19 RX ADMIN — ALBUTEROL SULFATE PRN MCG: 108 AEROSOL, METERED RESPIRATORY (INHALATION) at 14:25

## 2020-12-19 RX ADMIN — POTASSIUM CHLORIDE SCH MEQ: 1500 TABLET, EXTENDED RELEASE ORAL at 22:27

## 2020-12-19 RX ADMIN — FUROSEMIDE SCH MG: 10 INJECTION, SOLUTION INTRAMUSCULAR; INTRAVENOUS at 17:29

## 2020-12-19 RX ADMIN — LORAZEPAM PRN MG: 2 INJECTION, SOLUTION INTRAMUSCULAR; INTRAVENOUS at 22:26

## 2020-12-19 RX ADMIN — ALBUTEROL SULFATE PRN MCG: 108 AEROSOL, METERED RESPIRATORY (INHALATION) at 19:19

## 2020-12-19 RX ADMIN — FLUCONAZOLE IN SODIUM CHLORIDE SCH MLS/HR: 2 INJECTION, SOLUTION INTRAVENOUS at 09:43

## 2020-12-19 RX ADMIN — NYSTATIN SCH ML: 500000 SUSPENSION ORAL at 17:29

## 2020-12-19 RX ADMIN — Medication SCH STRIP: at 05:28

## 2020-12-19 RX ADMIN — FLUTICASONE PROPIONATE SCH MCG: 50 SPRAY, METERED NASAL at 22:00

## 2020-12-19 RX ADMIN — Medication SCH ML: at 08:00

## 2020-12-19 RX ADMIN — NYSTATIN SCH ML: 500000 SUSPENSION ORAL at 11:42

## 2020-12-19 RX ADMIN — MONTELUKAST SODIUM SCH MG: 10 TABLET, FILM COATED ORAL at 22:27

## 2020-12-19 RX ADMIN — FAMOTIDINE SCH MG: 20 TABLET, FILM COATED ORAL at 22:27

## 2020-12-19 RX ADMIN — VANCOMYCIN HYDROCHLORIDE SCH MLS/HR: 1 INJECTION, POWDER, LYOPHILIZED, FOR SOLUTION INTRAVENOUS at 03:36

## 2020-12-19 RX ADMIN — SERTRALINE HYDROCHLORIDE SCH MG: 50 TABLET, FILM COATED ORAL at 09:45

## 2020-12-19 RX ADMIN — FAMOTIDINE SCH MG: 20 TABLET, FILM COATED ORAL at 09:45

## 2020-12-20 VITALS — SYSTOLIC BLOOD PRESSURE: 110 MMHG | DIASTOLIC BLOOD PRESSURE: 68 MMHG

## 2020-12-20 VITALS — SYSTOLIC BLOOD PRESSURE: 108 MMHG | DIASTOLIC BLOOD PRESSURE: 76 MMHG

## 2020-12-20 VITALS — SYSTOLIC BLOOD PRESSURE: 143 MMHG | DIASTOLIC BLOOD PRESSURE: 83 MMHG

## 2020-12-20 VITALS — DIASTOLIC BLOOD PRESSURE: 70 MMHG | SYSTOLIC BLOOD PRESSURE: 126 MMHG

## 2020-12-20 VITALS — DIASTOLIC BLOOD PRESSURE: 74 MMHG | SYSTOLIC BLOOD PRESSURE: 120 MMHG

## 2020-12-20 LAB
ALBUMIN SERPL-MCNC: 2.5 G/DL (ref 3.4–5)
ALP SERPL-CCNC: 61 U/L (ref 45–117)
ALT SERPL-CCNC: 46 U/L (ref 13–56)
ANION GAP SERPL CALCULATED.3IONS-SCNC: 6 MMOL/L (ref 5–15)
BILIRUB SERPL-MCNC: 0.5 MG/DL (ref 0.2–1)
BUN SERPL-MCNC: 18 MG/DL (ref 7–18)
BUN/CREAT SERPL: 30.5
CALCIUM SERPL-MCNC: 8.9 MG/DL (ref 8.5–10.1)
CHLORIDE SERPL-SCNC: 100 MMOL/L (ref 98–107)
CO2 SERPL-SCNC: 27 MMOL/L (ref 21–32)
GLUCOSE SERPL-MCNC: 142 MG/DL (ref 74–106)
MAGNESIUM SERPL-MCNC: 2.6 MG/DL (ref 1.6–2.6)
POTASSIUM SERPL-SCNC: 4.1 MMOL/L (ref 3.5–5.1)
PROT SERPL-MCNC: 6.4 G/DL (ref 6.4–8.2)
SODIUM SERPL-SCNC: 133 MMOL/L (ref 136–145)

## 2020-12-20 PROCEDURE — 3E0336Z INTRODUCTION OF NUTRITIONAL SUBSTANCE INTO PERIPHERAL VEIN, PERCUTANEOUS APPROACH: ICD-10-PCS | Performed by: INTERNAL MEDICINE

## 2020-12-20 RX ADMIN — ZINC SULFATE CAP 220 MG (50 MG ELEMENTAL ZN) SCH MG: 220 (50 ZN) CAP at 10:10

## 2020-12-20 RX ADMIN — VANCOMYCIN HYDROCHLORIDE SCH MLS/HR: 1 INJECTION, POWDER, LYOPHILIZED, FOR SOLUTION INTRAVENOUS at 10:10

## 2020-12-20 RX ADMIN — POTASSIUM CHLORIDE SCH MEQ: 1500 TABLET, EXTENDED RELEASE ORAL at 10:10

## 2020-12-20 RX ADMIN — FAMOTIDINE SCH MG: 20 TABLET, FILM COATED ORAL at 10:10

## 2020-12-20 RX ADMIN — MONTELUKAST SODIUM SCH MG: 10 TABLET, FILM COATED ORAL at 22:05

## 2020-12-20 RX ADMIN — HUMAN INSULIN SCH UNITS: 100 INJECTION, SOLUTION SUBCUTANEOUS at 17:28

## 2020-12-20 RX ADMIN — HUMAN INSULIN SCH UNITS: 100 INJECTION, SOLUTION SUBCUTANEOUS at 05:35

## 2020-12-20 RX ADMIN — NYSTATIN SCH ML: 500000 SUSPENSION ORAL at 12:04

## 2020-12-20 RX ADMIN — Medication SCH STRIP: at 12:04

## 2020-12-20 RX ADMIN — Medication SCH ML: at 08:00

## 2020-12-20 RX ADMIN — NYSTATIN SCH ML: 500000 SUSPENSION ORAL at 22:04

## 2020-12-20 RX ADMIN — LORAZEPAM PRN MG: 2 INJECTION, SOLUTION INTRAMUSCULAR; INTRAVENOUS at 22:06

## 2020-12-20 RX ADMIN — FAMOTIDINE SCH MG: 20 TABLET, FILM COATED ORAL at 22:05

## 2020-12-20 RX ADMIN — VANCOMYCIN HYDROCHLORIDE SCH MLS/HR: 1 INJECTION, POWDER, LYOPHILIZED, FOR SOLUTION INTRAVENOUS at 17:28

## 2020-12-20 RX ADMIN — Medication SCH ML: at 17:27

## 2020-12-20 RX ADMIN — ENOXAPARIN SODIUM SCH MG: 60 INJECTION SUBCUTANEOUS at 22:05

## 2020-12-20 RX ADMIN — BUDESONIDE SCH MCG: 180 AEROSOL, POWDER RESPIRATORY (INHALATION) at 06:19

## 2020-12-20 RX ADMIN — Medication SCH UNIT: at 10:11

## 2020-12-20 RX ADMIN — Medication SCH STRIP: at 05:30

## 2020-12-20 RX ADMIN — FLUTICASONE PROPIONATE SCH MCG: 50 SPRAY, METERED NASAL at 22:16

## 2020-12-20 RX ADMIN — ALBUTEROL SULFATE PRN MCG: 108 AEROSOL, METERED RESPIRATORY (INHALATION) at 20:39

## 2020-12-20 RX ADMIN — NYSTATIN SCH ML: 500000 SUSPENSION ORAL at 05:25

## 2020-12-20 RX ADMIN — Medication SCH STRIP: at 17:27

## 2020-12-20 RX ADMIN — FUROSEMIDE SCH MG: 10 INJECTION, SOLUTION INTRAMUSCULAR; INTRAVENOUS at 05:27

## 2020-12-20 RX ADMIN — NYSTATIN SCH ML: 500000 SUSPENSION ORAL at 17:27

## 2020-12-20 RX ADMIN — Medication SCH MG: at 10:11

## 2020-12-20 RX ADMIN — ENOXAPARIN SODIUM SCH MG: 60 INJECTION SUBCUTANEOUS at 10:11

## 2020-12-20 RX ADMIN — SERTRALINE HYDROCHLORIDE SCH MG: 50 TABLET, FILM COATED ORAL at 10:11

## 2020-12-20 RX ADMIN — FLUTICASONE PROPIONATE SCH MCG: 50 SPRAY, METERED NASAL at 10:09

## 2020-12-20 RX ADMIN — PIPERACILLIN SODIUM AND TAZOBACTAM SODIUM SCH MLS/HR: .375; 3 INJECTION, POWDER, LYOPHILIZED, FOR SOLUTION INTRAVENOUS at 05:24

## 2020-12-20 RX ADMIN — HUMAN INSULIN SCH UNITS: 100 INJECTION, SOLUTION SUBCUTANEOUS at 12:03

## 2020-12-20 RX ADMIN — BUDESONIDE SCH MCG: 180 AEROSOL, POWDER RESPIRATORY (INHALATION) at 20:39

## 2020-12-20 RX ADMIN — Medication SCH ML: at 12:03

## 2020-12-20 RX ADMIN — FUROSEMIDE SCH MG: 10 INJECTION, SOLUTION INTRAMUSCULAR; INTRAVENOUS at 17:27

## 2020-12-20 RX ADMIN — POTASSIUM CHLORIDE SCH MEQ: 1500 TABLET, EXTENDED RELEASE ORAL at 22:04

## 2020-12-20 RX ADMIN — PIPERACILLIN SODIUM AND TAZOBACTAM SODIUM SCH MLS/HR: .375; 3 INJECTION, POWDER, LYOPHILIZED, FOR SOLUTION INTRAVENOUS at 22:03

## 2020-12-20 RX ADMIN — FLUCONAZOLE IN SODIUM CHLORIDE SCH MLS/HR: 2 INJECTION, SOLUTION INTRAVENOUS at 10:00

## 2020-12-20 RX ADMIN — PIPERACILLIN SODIUM AND TAZOBACTAM SODIUM SCH MLS/HR: .375; 3 INJECTION, POWDER, LYOPHILIZED, FOR SOLUTION INTRAVENOUS at 14:22

## 2020-12-20 RX ADMIN — ALBUTEROL SULFATE PRN MCG: 108 AEROSOL, METERED RESPIRATORY (INHALATION) at 06:19

## 2020-12-21 VITALS — DIASTOLIC BLOOD PRESSURE: 68 MMHG | SYSTOLIC BLOOD PRESSURE: 106 MMHG

## 2020-12-21 VITALS — SYSTOLIC BLOOD PRESSURE: 117 MMHG | DIASTOLIC BLOOD PRESSURE: 66 MMHG

## 2020-12-21 VITALS — DIASTOLIC BLOOD PRESSURE: 75 MMHG | SYSTOLIC BLOOD PRESSURE: 101 MMHG

## 2020-12-21 VITALS — DIASTOLIC BLOOD PRESSURE: 94 MMHG | SYSTOLIC BLOOD PRESSURE: 124 MMHG

## 2020-12-21 LAB
ALBUMIN SERPL-MCNC: 2.8 G/DL (ref 3.4–5)
ALP SERPL-CCNC: 72 U/L (ref 45–117)
ALT SERPL-CCNC: 59 U/L (ref 13–56)
ANION GAP SERPL CALCULATED.3IONS-SCNC: 8 MMOL/L (ref 5–15)
BILIRUB SERPL-MCNC: 0.6 MG/DL (ref 0.2–1)
BUN SERPL-MCNC: 17 MG/DL (ref 7–18)
BUN/CREAT SERPL: 23.3
CALCIUM SERPL-MCNC: 8.9 MG/DL (ref 8.5–10.1)
CHLORIDE SERPL-SCNC: 100 MMOL/L (ref 98–107)
CO2 SERPL-SCNC: 25 MMOL/L (ref 21–32)
GLUCOSE SERPL-MCNC: 125 MG/DL (ref 74–106)
HCT VFR BLD AUTO: 45.6 % (ref 36–46)
HGB BLD-MCNC: 14.6 G/DL (ref 12.2–16.2)
MAGNESIUM SERPL-MCNC: 2.6 MG/DL (ref 1.6–2.6)
MCH RBC QN AUTO: 24.6 PG (ref 28–32)
MCV RBC AUTO: 76.9 FL (ref 80–100)
POTASSIUM SERPL-SCNC: 3.6 MMOL/L (ref 3.5–5.1)
PROT SERPL-MCNC: 7.3 G/DL (ref 6.4–8.2)
SODIUM SERPL-SCNC: 133 MMOL/L (ref 136–145)
TRIGL SERPL-MCNC: 303 MG/DL (ref ?–150)

## 2020-12-21 RX ADMIN — BUDESONIDE SCH MCG: 180 AEROSOL, POWDER RESPIRATORY (INHALATION) at 19:15

## 2020-12-21 RX ADMIN — Medication SCH STRIP: at 23:53

## 2020-12-21 RX ADMIN — Medication SCH STRIP: at 05:38

## 2020-12-21 RX ADMIN — ENOXAPARIN SODIUM SCH MG: 60 INJECTION SUBCUTANEOUS at 12:05

## 2020-12-21 RX ADMIN — POTASSIUM CHLORIDE SCH MEQ: 1500 TABLET, EXTENDED RELEASE ORAL at 21:35

## 2020-12-21 RX ADMIN — FAMOTIDINE SCH MG: 20 TABLET, FILM COATED ORAL at 12:05

## 2020-12-21 RX ADMIN — Medication SCH STRIP: at 00:09

## 2020-12-21 RX ADMIN — PIPERACILLIN SODIUM AND TAZOBACTAM SODIUM SCH MLS/HR: .375; 3 INJECTION, POWDER, LYOPHILIZED, FOR SOLUTION INTRAVENOUS at 18:09

## 2020-12-21 RX ADMIN — NYSTATIN SCH ML: 500000 SUSPENSION ORAL at 12:05

## 2020-12-21 RX ADMIN — Medication SCH UNIT: at 12:05

## 2020-12-21 RX ADMIN — FUROSEMIDE SCH MG: 10 INJECTION, SOLUTION INTRAMUSCULAR; INTRAVENOUS at 17:29

## 2020-12-21 RX ADMIN — HUMAN INSULIN SCH UNITS: 100 INJECTION, SOLUTION SUBCUTANEOUS at 12:42

## 2020-12-21 RX ADMIN — NYSTATIN SCH ML: 500000 SUSPENSION ORAL at 21:35

## 2020-12-21 RX ADMIN — ALBUTEROL SULFATE PRN MCG: 108 AEROSOL, METERED RESPIRATORY (INHALATION) at 10:41

## 2020-12-21 RX ADMIN — PIPERACILLIN SODIUM AND TAZOBACTAM SODIUM SCH MLS/HR: .375; 3 INJECTION, POWDER, LYOPHILIZED, FOR SOLUTION INTRAVENOUS at 21:35

## 2020-12-21 RX ADMIN — Medication SCH ML: at 18:10

## 2020-12-21 RX ADMIN — NYSTATIN SCH ML: 500000 SUSPENSION ORAL at 05:38

## 2020-12-21 RX ADMIN — Medication SCH ML: at 08:05

## 2020-12-21 RX ADMIN — FLUTICASONE PROPIONATE SCH MCG: 50 SPRAY, METERED NASAL at 14:45

## 2020-12-21 RX ADMIN — FLUCONAZOLE IN SODIUM CHLORIDE SCH MLS/HR: 2 INJECTION, SOLUTION INTRAVENOUS at 14:33

## 2020-12-21 RX ADMIN — VANCOMYCIN HYDROCHLORIDE SCH MLS/HR: 1 INJECTION, POWDER, LYOPHILIZED, FOR SOLUTION INTRAVENOUS at 02:26

## 2020-12-21 RX ADMIN — MONTELUKAST SODIUM SCH MG: 10 TABLET, FILM COATED ORAL at 21:36

## 2020-12-21 RX ADMIN — FAMOTIDINE SCH MG: 20 TABLET, FILM COATED ORAL at 21:36

## 2020-12-21 RX ADMIN — HUMAN INSULIN SCH UNITS: 100 INJECTION, SOLUTION SUBCUTANEOUS at 17:19

## 2020-12-21 RX ADMIN — Medication SCH STRIP: at 17:19

## 2020-12-21 RX ADMIN — ALBUTEROL SULFATE PRN MCG: 108 AEROSOL, METERED RESPIRATORY (INHALATION) at 19:15

## 2020-12-21 RX ADMIN — HUMAN INSULIN SCH UNITS: 100 INJECTION, SOLUTION SUBCUTANEOUS at 00:15

## 2020-12-21 RX ADMIN — Medication SCH MG: at 12:05

## 2020-12-21 RX ADMIN — NYSTATIN SCH ML: 500000 SUSPENSION ORAL at 18:10

## 2020-12-21 RX ADMIN — HUMAN INSULIN SCH UNITS: 100 INJECTION, SOLUTION SUBCUTANEOUS at 05:38

## 2020-12-21 RX ADMIN — Medication SCH STRIP: at 12:30

## 2020-12-21 RX ADMIN — FLUTICASONE PROPIONATE SCH MCG: 50 SPRAY, METERED NASAL at 21:34

## 2020-12-21 RX ADMIN — POTASSIUM CHLORIDE SCH MEQ: 1500 TABLET, EXTENDED RELEASE ORAL at 12:05

## 2020-12-21 RX ADMIN — PIPERACILLIN SODIUM AND TAZOBACTAM SODIUM SCH MLS/HR: .375; 3 INJECTION, POWDER, LYOPHILIZED, FOR SOLUTION INTRAVENOUS at 05:37

## 2020-12-21 RX ADMIN — FUROSEMIDE SCH MG: 10 INJECTION, SOLUTION INTRAMUSCULAR; INTRAVENOUS at 05:37

## 2020-12-21 RX ADMIN — HUMAN INSULIN SCH UNITS: 100 INJECTION, SOLUTION SUBCUTANEOUS at 23:54

## 2020-12-21 RX ADMIN — SERTRALINE HYDROCHLORIDE SCH MG: 50 TABLET, FILM COATED ORAL at 12:05

## 2020-12-21 RX ADMIN — ZINC SULFATE CAP 220 MG (50 MG ELEMENTAL ZN) SCH MG: 220 (50 ZN) CAP at 12:05

## 2020-12-21 RX ADMIN — BUDESONIDE SCH MCG: 180 AEROSOL, POWDER RESPIRATORY (INHALATION) at 10:00

## 2020-12-21 RX ADMIN — VANCOMYCIN HYDROCHLORIDE SCH MLS/HR: 1 INJECTION, POWDER, LYOPHILIZED, FOR SOLUTION INTRAVENOUS at 12:05

## 2020-12-21 RX ADMIN — ENOXAPARIN SODIUM SCH MG: 60 INJECTION SUBCUTANEOUS at 21:37

## 2020-12-21 RX ADMIN — Medication SCH ML: at 12:00

## 2020-12-22 VITALS — DIASTOLIC BLOOD PRESSURE: 75 MMHG | SYSTOLIC BLOOD PRESSURE: 115 MMHG

## 2020-12-22 LAB
ALBUMIN SERPL-MCNC: 2.3 G/DL (ref 3.4–5)
ALP SERPL-CCNC: 65 U/L (ref 45–117)
ALT SERPL-CCNC: 47 U/L (ref 13–56)
ANION GAP SERPL CALCULATED.3IONS-SCNC: 7 MMOL/L (ref 5–15)
BILIRUB SERPL-MCNC: 0.4 MG/DL (ref 0.2–1)
BUN SERPL-MCNC: 16 MG/DL (ref 7–18)
BUN/CREAT SERPL: 24.6
CALCIUM SERPL-MCNC: 8.8 MG/DL (ref 8.5–10.1)
CHLORIDE SERPL-SCNC: 104 MMOL/L (ref 98–107)
CO2 SERPL-SCNC: 25 MMOL/L (ref 21–32)
GLUCOSE SERPL-MCNC: 143 MG/DL (ref 74–106)
HCT VFR BLD AUTO: 40.6 % (ref 36–46)
HGB BLD-MCNC: 13.3 G/DL (ref 12.2–16.2)
MAGNESIUM SERPL-MCNC: 2.6 MG/DL (ref 1.6–2.6)
MCH RBC QN AUTO: 25.2 PG (ref 28–32)
MCV RBC AUTO: 76.9 FL (ref 80–100)
POTASSIUM SERPL-SCNC: 4.1 MMOL/L (ref 3.5–5.1)
PROT SERPL-MCNC: 6.3 G/DL (ref 6.4–8.2)
SODIUM SERPL-SCNC: 136 MMOL/L (ref 136–145)

## 2020-12-22 RX ADMIN — ALBUTEROL SULFATE PRN MCG: 108 AEROSOL, METERED RESPIRATORY (INHALATION) at 18:40

## 2020-12-22 RX ADMIN — FUROSEMIDE SCH MG: 10 INJECTION, SOLUTION INTRAMUSCULAR; INTRAVENOUS at 06:46

## 2020-12-22 RX ADMIN — Medication SCH ML: at 18:14

## 2020-12-22 RX ADMIN — BUDESONIDE SCH MCG: 180 AEROSOL, POWDER RESPIRATORY (INHALATION) at 18:41

## 2020-12-22 RX ADMIN — Medication SCH STRIP: at 06:47

## 2020-12-22 RX ADMIN — POTASSIUM CHLORIDE SCH MEQ: 1500 TABLET, EXTENDED RELEASE ORAL at 22:00

## 2020-12-22 RX ADMIN — PIPERACILLIN SODIUM AND TAZOBACTAM SODIUM SCH MLS/HR: .375; 3 INJECTION, POWDER, LYOPHILIZED, FOR SOLUTION INTRAVENOUS at 06:47

## 2020-12-22 RX ADMIN — HUMAN INSULIN SCH UNITS: 100 INJECTION, SOLUTION SUBCUTANEOUS at 06:00

## 2020-12-22 RX ADMIN — POTASSIUM CHLORIDE SCH MEQ: 1500 TABLET, EXTENDED RELEASE ORAL at 11:56

## 2020-12-22 RX ADMIN — ZINC SULFATE CAP 220 MG (50 MG ELEMENTAL ZN) SCH MG: 220 (50 ZN) CAP at 11:56

## 2020-12-22 RX ADMIN — BUDESONIDE SCH MCG: 180 AEROSOL, POWDER RESPIRATORY (INHALATION) at 18:36

## 2020-12-22 RX ADMIN — Medication SCH STRIP: at 17:20

## 2020-12-22 RX ADMIN — FLUTICASONE PROPIONATE SCH MCG: 50 SPRAY, METERED NASAL at 11:56

## 2020-12-22 RX ADMIN — ENOXAPARIN SODIUM SCH MG: 60 INJECTION SUBCUTANEOUS at 11:56

## 2020-12-22 RX ADMIN — MONTELUKAST SODIUM SCH MG: 10 TABLET, FILM COATED ORAL at 22:17

## 2020-12-22 RX ADMIN — POTASSIUM CHLORIDE SCH MEQ: 1500 TABLET, EXTENDED RELEASE ORAL at 22:39

## 2020-12-22 RX ADMIN — SERTRALINE HYDROCHLORIDE SCH MG: 50 TABLET, FILM COATED ORAL at 11:56

## 2020-12-22 RX ADMIN — HUMAN INSULIN SCH UNITS: 100 INJECTION, SOLUTION SUBCUTANEOUS at 12:46

## 2020-12-22 RX ADMIN — FAMOTIDINE SCH MG: 20 TABLET, FILM COATED ORAL at 11:56

## 2020-12-22 RX ADMIN — NYSTATIN SCH ML: 500000 SUSPENSION ORAL at 18:13

## 2020-12-22 RX ADMIN — NYSTATIN SCH ML: 500000 SUSPENSION ORAL at 22:17

## 2020-12-22 RX ADMIN — Medication SCH STRIP: at 23:59

## 2020-12-22 RX ADMIN — FAMOTIDINE SCH MG: 20 TABLET, FILM COATED ORAL at 22:17

## 2020-12-22 RX ADMIN — FLUCONAZOLE IN SODIUM CHLORIDE SCH MLS/HR: 2 INJECTION, SOLUTION INTRAVENOUS at 11:56

## 2020-12-22 RX ADMIN — ALBUTEROL SULFATE PRN MCG: 108 AEROSOL, METERED RESPIRATORY (INHALATION) at 18:36

## 2020-12-22 RX ADMIN — Medication SCH STRIP: at 11:56

## 2020-12-22 RX ADMIN — Medication SCH MG: at 11:56

## 2020-12-22 RX ADMIN — MORPHINE SULFATE PRN MG: 2 INJECTION, SOLUTION INTRAMUSCULAR; INTRAVENOUS at 19:56

## 2020-12-22 RX ADMIN — NYSTATIN SCH ML: 500000 SUSPENSION ORAL at 11:56

## 2020-12-22 RX ADMIN — Medication SCH UNIT: at 11:56

## 2020-12-22 RX ADMIN — ENOXAPARIN SODIUM SCH MG: 60 INJECTION SUBCUTANEOUS at 22:18

## 2020-12-22 RX ADMIN — NYSTATIN SCH ML: 500000 SUSPENSION ORAL at 06:47

## 2020-12-22 RX ADMIN — HUMAN INSULIN SCH UNITS: 100 INJECTION, SOLUTION SUBCUTANEOUS at 23:59

## 2020-12-22 RX ADMIN — FLUTICASONE PROPIONATE SCH MCG: 50 SPRAY, METERED NASAL at 22:16

## 2020-12-22 RX ADMIN — HUMAN INSULIN SCH UNITS: 100 INJECTION, SOLUTION SUBCUTANEOUS at 17:32

## 2020-12-22 RX ADMIN — Medication SCH ML: at 08:18

## 2020-12-23 VITALS — DIASTOLIC BLOOD PRESSURE: 74 MMHG | SYSTOLIC BLOOD PRESSURE: 116 MMHG

## 2020-12-23 VITALS — SYSTOLIC BLOOD PRESSURE: 127 MMHG | DIASTOLIC BLOOD PRESSURE: 86 MMHG

## 2020-12-23 VITALS — SYSTOLIC BLOOD PRESSURE: 134 MMHG | DIASTOLIC BLOOD PRESSURE: 65 MMHG

## 2020-12-23 VITALS — DIASTOLIC BLOOD PRESSURE: 72 MMHG | SYSTOLIC BLOOD PRESSURE: 132 MMHG

## 2020-12-23 LAB
ALBUMIN SERPL-MCNC: 2.2 G/DL (ref 3.4–5)
ALP SERPL-CCNC: 58 U/L (ref 45–117)
ALT SERPL-CCNC: 45 U/L (ref 13–56)
ANION GAP SERPL CALCULATED.3IONS-SCNC: 6 MMOL/L (ref 5–15)
BILIRUB SERPL-MCNC: 0.2 MG/DL (ref 0.2–1)
BUN SERPL-MCNC: 18 MG/DL (ref 7–18)
BUN/CREAT SERPL: 29
CALCIUM SERPL-MCNC: 8.7 MG/DL (ref 8.5–10.1)
CHLORIDE SERPL-SCNC: 110 MMOL/L (ref 98–107)
CO2 SERPL-SCNC: 22 MMOL/L (ref 21–32)
GLUCOSE SERPL-MCNC: 186 MG/DL (ref 74–106)
HCT VFR BLD AUTO: 38.3 % (ref 36–46)
HGB BLD-MCNC: 12.2 G/DL (ref 12.2–16.2)
MAGNESIUM SERPL-MCNC: 2.3 MG/DL (ref 1.6–2.6)
MCH RBC QN AUTO: 24.8 PG (ref 28–32)
MCV RBC AUTO: 77.7 FL (ref 80–100)
NRBC BLD QL AUTO: 0 %
POTASSIUM SERPL-SCNC: 4.7 MMOL/L (ref 3.5–5.1)
PROT SERPL-MCNC: 5.9 G/DL (ref 6.4–8.2)
SODIUM SERPL-SCNC: 138 MMOL/L (ref 136–145)

## 2020-12-23 RX ADMIN — HUMAN INSULIN SCH UNITS: 100 INJECTION, SOLUTION SUBCUTANEOUS at 06:40

## 2020-12-23 RX ADMIN — HUMAN INSULIN SCH UNITS: 100 INJECTION, SOLUTION SUBCUTANEOUS at 13:04

## 2020-12-23 RX ADMIN — Medication SCH ML: at 08:26

## 2020-12-23 RX ADMIN — FAMOTIDINE SCH MG: 20 TABLET, FILM COATED ORAL at 22:28

## 2020-12-23 RX ADMIN — FLUCONAZOLE IN SODIUM CHLORIDE SCH MLS/HR: 2 INJECTION, SOLUTION INTRAVENOUS at 17:29

## 2020-12-23 RX ADMIN — LORAZEPAM PRN MG: 2 INJECTION, SOLUTION INTRAMUSCULAR; INTRAVENOUS at 11:33

## 2020-12-23 RX ADMIN — Medication SCH MG: at 11:10

## 2020-12-23 RX ADMIN — NYSTATIN SCH ML: 500000 SUSPENSION ORAL at 13:04

## 2020-12-23 RX ADMIN — MONTELUKAST SODIUM SCH MG: 10 TABLET, FILM COATED ORAL at 22:28

## 2020-12-23 RX ADMIN — POTASSIUM CHLORIDE SCH MEQ: 1500 TABLET, EXTENDED RELEASE ORAL at 22:28

## 2020-12-23 RX ADMIN — ENOXAPARIN SODIUM SCH MG: 60 INJECTION SUBCUTANEOUS at 22:28

## 2020-12-23 RX ADMIN — ALBUTEROL SULFATE PRN MCG: 108 AEROSOL, METERED RESPIRATORY (INHALATION) at 19:56

## 2020-12-23 RX ADMIN — NYSTATIN SCH ML: 500000 SUSPENSION ORAL at 06:31

## 2020-12-23 RX ADMIN — Medication SCH STRIP: at 06:40

## 2020-12-23 RX ADMIN — ZINC SULFATE CAP 220 MG (50 MG ELEMENTAL ZN) SCH MG: 220 (50 ZN) CAP at 10:00

## 2020-12-23 RX ADMIN — SERTRALINE HYDROCHLORIDE SCH MG: 50 TABLET, FILM COATED ORAL at 11:09

## 2020-12-23 RX ADMIN — Medication SCH ML: at 13:05

## 2020-12-23 RX ADMIN — ENOXAPARIN SODIUM SCH MG: 60 INJECTION SUBCUTANEOUS at 11:12

## 2020-12-23 RX ADMIN — BUDESONIDE SCH MCG: 180 AEROSOL, POWDER RESPIRATORY (INHALATION) at 19:55

## 2020-12-23 RX ADMIN — FLUTICASONE PROPIONATE SCH MCG: 50 SPRAY, METERED NASAL at 22:27

## 2020-12-23 RX ADMIN — FUROSEMIDE SCH MG: 10 INJECTION, SOLUTION INTRAMUSCULAR; INTRAVENOUS at 11:07

## 2020-12-23 RX ADMIN — Medication SCH ML: at 17:31

## 2020-12-23 RX ADMIN — Medication SCH STRIP: at 17:31

## 2020-12-23 RX ADMIN — HUMAN INSULIN SCH UNITS: 100 INJECTION, SOLUTION SUBCUTANEOUS at 17:33

## 2020-12-23 RX ADMIN — NYSTATIN SCH ML: 500000 SUSPENSION ORAL at 22:28

## 2020-12-23 RX ADMIN — Medication SCH STRIP: at 12:59

## 2020-12-23 RX ADMIN — POTASSIUM CHLORIDE SCH MEQ: 1500 TABLET, EXTENDED RELEASE ORAL at 10:00

## 2020-12-23 RX ADMIN — FLUTICASONE PROPIONATE SCH MCG: 50 SPRAY, METERED NASAL at 11:12

## 2020-12-23 RX ADMIN — NYSTATIN SCH ML: 500000 SUSPENSION ORAL at 17:31

## 2020-12-23 RX ADMIN — FAMOTIDINE SCH MG: 20 TABLET, FILM COATED ORAL at 11:11

## 2020-12-23 RX ADMIN — BUDESONIDE SCH MCG: 180 AEROSOL, POWDER RESPIRATORY (INHALATION) at 06:30

## 2020-12-23 RX ADMIN — Medication SCH UNIT: at 11:10

## 2020-12-23 RX ADMIN — LORAZEPAM PRN MG: 2 INJECTION, SOLUTION INTRAMUSCULAR; INTRAVENOUS at 23:06

## 2020-12-24 VITALS — DIASTOLIC BLOOD PRESSURE: 69 MMHG | SYSTOLIC BLOOD PRESSURE: 118 MMHG

## 2020-12-24 VITALS — SYSTOLIC BLOOD PRESSURE: 117 MMHG | DIASTOLIC BLOOD PRESSURE: 75 MMHG

## 2020-12-24 VITALS — SYSTOLIC BLOOD PRESSURE: 119 MMHG | DIASTOLIC BLOOD PRESSURE: 50 MMHG

## 2020-12-24 LAB
ALBUMIN SERPL-MCNC: 2.3 G/DL (ref 3.4–5)
ALP SERPL-CCNC: 63 U/L (ref 45–117)
ALT SERPL-CCNC: 45 U/L (ref 13–56)
ANION GAP SERPL CALCULATED.3IONS-SCNC: 7 MMOL/L (ref 5–15)
BILIRUB SERPL-MCNC: 0.2 MG/DL (ref 0.2–1)
BUN SERPL-MCNC: 20 MG/DL (ref 7–18)
BUN/CREAT SERPL: 42.6
CALCIUM SERPL-MCNC: 8.8 MG/DL (ref 8.5–10.1)
CHLORIDE SERPL-SCNC: 104 MMOL/L (ref 98–107)
CO2 SERPL-SCNC: 25 MMOL/L (ref 21–32)
GLUCOSE SERPL-MCNC: 154 MG/DL (ref 74–106)
MAGNESIUM SERPL-MCNC: 2.6 MG/DL (ref 1.6–2.6)
POTASSIUM SERPL-SCNC: 4.9 MMOL/L (ref 3.5–5.1)
PROT SERPL-MCNC: 5.9 G/DL (ref 6.4–8.2)
SODIUM SERPL-SCNC: 136 MMOL/L (ref 136–145)

## 2020-12-24 RX ADMIN — FLUTICASONE PROPIONATE SCH MCG: 50 SPRAY, METERED NASAL at 10:30

## 2020-12-24 RX ADMIN — SERTRALINE HYDROCHLORIDE SCH MG: 50 TABLET, FILM COATED ORAL at 10:32

## 2020-12-24 RX ADMIN — BUDESONIDE SCH MCG: 180 AEROSOL, POWDER RESPIRATORY (INHALATION) at 10:30

## 2020-12-24 RX ADMIN — NYSTATIN SCH ML: 500000 SUSPENSION ORAL at 22:22

## 2020-12-24 RX ADMIN — FAMOTIDINE SCH MG: 20 TABLET, FILM COATED ORAL at 22:22

## 2020-12-24 RX ADMIN — Medication SCH ML: at 08:00

## 2020-12-24 RX ADMIN — MONTELUKAST SODIUM SCH MG: 10 TABLET, FILM COATED ORAL at 22:22

## 2020-12-24 RX ADMIN — HUMAN INSULIN SCH UNITS: 100 INJECTION, SOLUTION SUBCUTANEOUS at 17:29

## 2020-12-24 RX ADMIN — Medication SCH ML: at 17:29

## 2020-12-24 RX ADMIN — HUMAN INSULIN SCH UNITS: 100 INJECTION, SOLUTION SUBCUTANEOUS at 11:43

## 2020-12-24 RX ADMIN — ZINC SULFATE CAP 220 MG (50 MG ELEMENTAL ZN) SCH MG: 220 (50 ZN) CAP at 10:31

## 2020-12-24 RX ADMIN — Medication SCH ML: at 11:43

## 2020-12-24 RX ADMIN — Medication SCH STRIP: at 00:00

## 2020-12-24 RX ADMIN — Medication SCH STRIP: at 17:29

## 2020-12-24 RX ADMIN — HUMAN INSULIN SCH UNITS: 100 INJECTION, SOLUTION SUBCUTANEOUS at 06:29

## 2020-12-24 RX ADMIN — FLUCONAZOLE IN SODIUM CHLORIDE SCH MLS/HR: 2 INJECTION, SOLUTION INTRAVENOUS at 10:30

## 2020-12-24 RX ADMIN — Medication SCH STRIP: at 11:43

## 2020-12-24 RX ADMIN — ENOXAPARIN SODIUM SCH MG: 60 INJECTION SUBCUTANEOUS at 22:22

## 2020-12-24 RX ADMIN — FUROSEMIDE SCH MG: 10 INJECTION, SOLUTION INTRAMUSCULAR; INTRAVENOUS at 10:31

## 2020-12-24 RX ADMIN — Medication SCH MG: at 10:32

## 2020-12-24 RX ADMIN — ENOXAPARIN SODIUM SCH MG: 60 INJECTION SUBCUTANEOUS at 10:32

## 2020-12-24 RX ADMIN — NYSTATIN SCH ML: 500000 SUSPENSION ORAL at 17:29

## 2020-12-24 RX ADMIN — BUDESONIDE SCH MCG: 180 AEROSOL, POWDER RESPIRATORY (INHALATION) at 22:00

## 2020-12-24 RX ADMIN — POTASSIUM CHLORIDE SCH MEQ: 1500 TABLET, EXTENDED RELEASE ORAL at 22:21

## 2020-12-24 RX ADMIN — HUMAN INSULIN SCH UNITS: 100 INJECTION, SOLUTION SUBCUTANEOUS at 00:00

## 2020-12-24 RX ADMIN — FLUTICASONE PROPIONATE SCH MCG: 50 SPRAY, METERED NASAL at 22:21

## 2020-12-24 RX ADMIN — NYSTATIN SCH ML: 500000 SUSPENSION ORAL at 11:43

## 2020-12-24 RX ADMIN — Medication SCH UNIT: at 10:32

## 2020-12-24 RX ADMIN — FAMOTIDINE SCH MG: 20 TABLET, FILM COATED ORAL at 10:32

## 2020-12-24 RX ADMIN — NYSTATIN SCH ML: 500000 SUSPENSION ORAL at 06:28

## 2020-12-24 RX ADMIN — POTASSIUM CHLORIDE SCH MEQ: 1500 TABLET, EXTENDED RELEASE ORAL at 10:31

## 2020-12-24 RX ADMIN — Medication SCH STRIP: at 06:28

## 2020-12-25 VITALS — DIASTOLIC BLOOD PRESSURE: 74 MMHG | SYSTOLIC BLOOD PRESSURE: 117 MMHG

## 2020-12-25 VITALS — SYSTOLIC BLOOD PRESSURE: 123 MMHG | DIASTOLIC BLOOD PRESSURE: 73 MMHG

## 2020-12-25 VITALS — DIASTOLIC BLOOD PRESSURE: 82 MMHG | SYSTOLIC BLOOD PRESSURE: 131 MMHG

## 2020-12-25 VITALS — DIASTOLIC BLOOD PRESSURE: 70 MMHG | SYSTOLIC BLOOD PRESSURE: 141 MMHG

## 2020-12-25 LAB
ALBUMIN SERPL-MCNC: 2.4 G/DL (ref 3.4–5)
ALP SERPL-CCNC: 67 U/L (ref 45–117)
ALT SERPL-CCNC: 55 U/L (ref 13–56)
ANION GAP SERPL CALCULATED.3IONS-SCNC: 5 MMOL/L (ref 5–15)
BILIRUB SERPL-MCNC: 0.3 MG/DL (ref 0.2–1)
BUN SERPL-MCNC: 26 MG/DL (ref 7–18)
BUN/CREAT SERPL: 46.4
CALCIUM SERPL-MCNC: 9.1 MG/DL (ref 8.5–10.1)
CHLORIDE SERPL-SCNC: 99 MMOL/L (ref 98–107)
CO2 SERPL-SCNC: 30 MMOL/L (ref 21–32)
GLUCOSE SERPL-MCNC: 158 MG/DL (ref 74–106)
MAGNESIUM SERPL-MCNC: 2.4 MG/DL (ref 1.6–2.6)
POTASSIUM SERPL-SCNC: 3.9 MMOL/L (ref 3.5–5.1)
PROT SERPL-MCNC: 6.3 G/DL (ref 6.4–8.2)
SODIUM SERPL-SCNC: 134 MMOL/L (ref 136–145)

## 2020-12-25 RX ADMIN — NYSTATIN SCH ML: 500000 SUSPENSION ORAL at 18:02

## 2020-12-25 RX ADMIN — BUDESONIDE SCH MCG: 180 AEROSOL, POWDER RESPIRATORY (INHALATION) at 09:40

## 2020-12-25 RX ADMIN — POTASSIUM CHLORIDE SCH MEQ: 1500 TABLET, EXTENDED RELEASE ORAL at 21:33

## 2020-12-25 RX ADMIN — Medication SCH MG: at 10:29

## 2020-12-25 RX ADMIN — Medication SCH STRIP: at 11:42

## 2020-12-25 RX ADMIN — Medication SCH ML: at 18:02

## 2020-12-25 RX ADMIN — NYSTATIN SCH ML: 500000 SUSPENSION ORAL at 11:41

## 2020-12-25 RX ADMIN — NYSTATIN SCH ML: 500000 SUSPENSION ORAL at 21:33

## 2020-12-25 RX ADMIN — BUDESONIDE SCH MCG: 180 AEROSOL, POWDER RESPIRATORY (INHALATION) at 22:00

## 2020-12-25 RX ADMIN — FAMOTIDINE SCH MG: 20 TABLET, FILM COATED ORAL at 21:34

## 2020-12-25 RX ADMIN — FLUCONAZOLE IN SODIUM CHLORIDE SCH MLS/HR: 2 INJECTION, SOLUTION INTRAVENOUS at 10:18

## 2020-12-25 RX ADMIN — HUMAN INSULIN SCH UNITS: 100 INJECTION, SOLUTION SUBCUTANEOUS at 06:30

## 2020-12-25 RX ADMIN — FLUTICASONE PROPIONATE SCH MCG: 50 SPRAY, METERED NASAL at 10:17

## 2020-12-25 RX ADMIN — ENOXAPARIN SODIUM SCH MG: 60 INJECTION SUBCUTANEOUS at 21:34

## 2020-12-25 RX ADMIN — HUMAN INSULIN SCH UNITS: 100 INJECTION, SOLUTION SUBCUTANEOUS at 11:42

## 2020-12-25 RX ADMIN — Medication SCH ML: at 11:42

## 2020-12-25 RX ADMIN — HUMAN INSULIN SCH UNITS: 100 INJECTION, SOLUTION SUBCUTANEOUS at 18:03

## 2020-12-25 RX ADMIN — NYSTATIN SCH ML: 500000 SUSPENSION ORAL at 06:24

## 2020-12-25 RX ADMIN — Medication SCH ML: at 08:00

## 2020-12-25 RX ADMIN — ZINC SULFATE CAP 220 MG (50 MG ELEMENTAL ZN) SCH MG: 220 (50 ZN) CAP at 10:29

## 2020-12-25 RX ADMIN — FLUTICASONE PROPIONATE SCH MCG: 50 SPRAY, METERED NASAL at 21:33

## 2020-12-25 RX ADMIN — POTASSIUM CHLORIDE SCH MEQ: 1500 TABLET, EXTENDED RELEASE ORAL at 10:29

## 2020-12-25 RX ADMIN — SERTRALINE HYDROCHLORIDE SCH MG: 50 TABLET, FILM COATED ORAL at 10:29

## 2020-12-25 RX ADMIN — HUMAN INSULIN SCH UNITS: 100 INJECTION, SOLUTION SUBCUTANEOUS at 00:17

## 2020-12-25 RX ADMIN — Medication SCH STRIP: at 18:02

## 2020-12-25 RX ADMIN — Medication SCH UNIT: at 10:29

## 2020-12-25 RX ADMIN — MONTELUKAST SODIUM SCH MG: 10 TABLET, FILM COATED ORAL at 21:34

## 2020-12-25 RX ADMIN — FUROSEMIDE SCH MG: 10 INJECTION, SOLUTION INTRAMUSCULAR; INTRAVENOUS at 10:18

## 2020-12-25 RX ADMIN — Medication SCH STRIP: at 00:16

## 2020-12-25 RX ADMIN — FAMOTIDINE SCH MG: 20 TABLET, FILM COATED ORAL at 10:18

## 2020-12-25 RX ADMIN — ENOXAPARIN SODIUM SCH MG: 60 INJECTION SUBCUTANEOUS at 10:29

## 2020-12-25 RX ADMIN — Medication SCH STRIP: at 06:24

## 2020-12-26 VITALS — SYSTOLIC BLOOD PRESSURE: 135 MMHG | DIASTOLIC BLOOD PRESSURE: 71 MMHG

## 2020-12-26 VITALS — DIASTOLIC BLOOD PRESSURE: 64 MMHG | SYSTOLIC BLOOD PRESSURE: 126 MMHG

## 2020-12-26 LAB
ALBUMIN SERPL-MCNC: 2.5 G/DL (ref 3.4–5)
ALP SERPL-CCNC: 60 U/L (ref 45–117)
ALT SERPL-CCNC: 65 U/L (ref 13–56)
ANION GAP SERPL CALCULATED.3IONS-SCNC: 5 MMOL/L (ref 5–15)
BILIRUB SERPL-MCNC: 0.2 MG/DL (ref 0.2–1)
BUN SERPL-MCNC: 27 MG/DL (ref 7–18)
BUN/CREAT SERPL: 49.1
CALCIUM SERPL-MCNC: 8.8 MG/DL (ref 8.5–10.1)
CHLORIDE SERPL-SCNC: 99 MMOL/L (ref 98–107)
CO2 SERPL-SCNC: 30 MMOL/L (ref 21–32)
GLUCOSE SERPL-MCNC: 169 MG/DL (ref 74–106)
MAGNESIUM SERPL-MCNC: 2.2 MG/DL (ref 1.6–2.6)
POTASSIUM SERPL-SCNC: 4.2 MMOL/L (ref 3.5–5.1)
PROT SERPL-MCNC: 5.8 G/DL (ref 6.4–8.2)
SODIUM SERPL-SCNC: 134 MMOL/L (ref 136–145)

## 2020-12-26 RX ADMIN — BUDESONIDE SCH MCG: 180 AEROSOL, POWDER RESPIRATORY (INHALATION) at 23:06

## 2020-12-26 RX ADMIN — Medication SCH ML: at 08:00

## 2020-12-26 RX ADMIN — FAMOTIDINE SCH MG: 20 TABLET, FILM COATED ORAL at 23:06

## 2020-12-26 RX ADMIN — MONTELUKAST SODIUM SCH MG: 10 TABLET, FILM COATED ORAL at 23:07

## 2020-12-26 RX ADMIN — Medication SCH STRIP: at 00:23

## 2020-12-26 RX ADMIN — Medication SCH ML: at 18:07

## 2020-12-26 RX ADMIN — POTASSIUM CHLORIDE SCH MEQ: 1500 TABLET, EXTENDED RELEASE ORAL at 09:51

## 2020-12-26 RX ADMIN — BUDESONIDE SCH MCG: 180 AEROSOL, POWDER RESPIRATORY (INHALATION) at 19:35

## 2020-12-26 RX ADMIN — HUMAN INSULIN SCH UNITS: 100 INJECTION, SOLUTION SUBCUTANEOUS at 12:36

## 2020-12-26 RX ADMIN — HUMAN INSULIN SCH UNITS: 100 INJECTION, SOLUTION SUBCUTANEOUS at 06:45

## 2020-12-26 RX ADMIN — ENOXAPARIN SODIUM SCH MG: 60 INJECTION SUBCUTANEOUS at 23:07

## 2020-12-26 RX ADMIN — FLUCONAZOLE IN SODIUM CHLORIDE SCH MLS/HR: 2 INJECTION, SOLUTION INTRAVENOUS at 11:49

## 2020-12-26 RX ADMIN — FAMOTIDINE SCH MG: 20 TABLET, FILM COATED ORAL at 09:52

## 2020-12-26 RX ADMIN — NYSTATIN SCH ML: 500000 SUSPENSION ORAL at 06:20

## 2020-12-26 RX ADMIN — Medication SCH STRIP: at 06:43

## 2020-12-26 RX ADMIN — HUMAN INSULIN SCH UNITS: 100 INJECTION, SOLUTION SUBCUTANEOUS at 00:24

## 2020-12-26 RX ADMIN — FLUTICASONE PROPIONATE SCH MCG: 50 SPRAY, METERED NASAL at 09:50

## 2020-12-26 RX ADMIN — POTASSIUM CHLORIDE SCH MEQ: 1500 TABLET, EXTENDED RELEASE ORAL at 23:06

## 2020-12-26 RX ADMIN — SERTRALINE HYDROCHLORIDE SCH MG: 50 TABLET, FILM COATED ORAL at 09:53

## 2020-12-26 RX ADMIN — HUMAN INSULIN SCH UNITS: 100 INJECTION, SOLUTION SUBCUTANEOUS at 18:07

## 2020-12-26 RX ADMIN — Medication SCH STRIP: at 12:35

## 2020-12-26 RX ADMIN — Medication SCH UNIT: at 09:51

## 2020-12-26 RX ADMIN — BUDESONIDE SCH MCG: 180 AEROSOL, POWDER RESPIRATORY (INHALATION) at 09:59

## 2020-12-26 RX ADMIN — FLUTICASONE PROPIONATE SCH MCG: 50 SPRAY, METERED NASAL at 23:06

## 2020-12-26 RX ADMIN — NYSTATIN SCH ML: 500000 SUSPENSION ORAL at 18:07

## 2020-12-26 RX ADMIN — NYSTATIN SCH ML: 500000 SUSPENSION ORAL at 12:35

## 2020-12-26 RX ADMIN — Medication SCH STRIP: at 18:06

## 2020-12-26 RX ADMIN — ENOXAPARIN SODIUM SCH MG: 60 INJECTION SUBCUTANEOUS at 10:00

## 2020-12-26 RX ADMIN — Medication SCH MG: at 09:52

## 2020-12-26 RX ADMIN — NYSTATIN SCH ML: 500000 SUSPENSION ORAL at 23:06

## 2020-12-26 RX ADMIN — Medication SCH ML: at 12:30

## 2020-12-26 RX ADMIN — ZINC SULFATE CAP 220 MG (50 MG ELEMENTAL ZN) SCH MG: 220 (50 ZN) CAP at 09:50

## 2020-12-26 RX ADMIN — FUROSEMIDE SCH MG: 10 INJECTION, SOLUTION INTRAMUSCULAR; INTRAVENOUS at 09:54

## 2020-12-27 VITALS — DIASTOLIC BLOOD PRESSURE: 79 MMHG | SYSTOLIC BLOOD PRESSURE: 132 MMHG

## 2020-12-27 VITALS — DIASTOLIC BLOOD PRESSURE: 63 MMHG | SYSTOLIC BLOOD PRESSURE: 115 MMHG

## 2020-12-27 VITALS — SYSTOLIC BLOOD PRESSURE: 115 MMHG | DIASTOLIC BLOOD PRESSURE: 68 MMHG

## 2020-12-27 LAB
ALBUMIN SERPL-MCNC: 2.4 G/DL (ref 3.4–5)
ALP SERPL-CCNC: 62 U/L (ref 45–117)
ALT SERPL-CCNC: 159 U/L (ref 13–56)
ANION GAP SERPL CALCULATED.3IONS-SCNC: 6 MMOL/L (ref 5–15)
BILIRUB SERPL-MCNC: 0.3 MG/DL (ref 0.2–1)
BUN SERPL-MCNC: 27 MG/DL (ref 7–18)
BUN/CREAT SERPL: 58.7
CALCIUM SERPL-MCNC: 8.8 MG/DL (ref 8.5–10.1)
CHLORIDE SERPL-SCNC: 98 MMOL/L (ref 98–107)
CO2 SERPL-SCNC: 31 MMOL/L (ref 21–32)
GLUCOSE SERPL-MCNC: 153 MG/DL (ref 74–106)
MAGNESIUM SERPL-MCNC: 2.4 MG/DL (ref 1.6–2.6)
POTASSIUM SERPL-SCNC: 4.3 MMOL/L (ref 3.5–5.1)
PROT SERPL-MCNC: 5.7 G/DL (ref 6.4–8.2)
SODIUM SERPL-SCNC: 135 MMOL/L (ref 136–145)

## 2020-12-27 RX ADMIN — ENOXAPARIN SODIUM SCH MG: 60 INJECTION SUBCUTANEOUS at 21:26

## 2020-12-27 RX ADMIN — Medication SCH ML: at 08:00

## 2020-12-27 RX ADMIN — Medication SCH STRIP: at 21:26

## 2020-12-27 RX ADMIN — NYSTATIN SCH ML: 500000 SUSPENSION ORAL at 18:12

## 2020-12-27 RX ADMIN — FLUCONAZOLE IN SODIUM CHLORIDE SCH MLS/HR: 2 INJECTION, SOLUTION INTRAVENOUS at 10:47

## 2020-12-27 RX ADMIN — Medication SCH ML: at 18:12

## 2020-12-27 RX ADMIN — HUMAN INSULIN SCH UNITS: 100 INJECTION, SOLUTION SUBCUTANEOUS at 00:06

## 2020-12-27 RX ADMIN — SERTRALINE HYDROCHLORIDE SCH MG: 50 TABLET, FILM COATED ORAL at 11:30

## 2020-12-27 RX ADMIN — HUMAN INSULIN SCH UNITS: 100 INJECTION, SOLUTION SUBCUTANEOUS at 18:13

## 2020-12-27 RX ADMIN — FAMOTIDINE SCH MG: 20 TABLET, FILM COATED ORAL at 21:25

## 2020-12-27 RX ADMIN — BUDESONIDE SCH MCG: 180 AEROSOL, POWDER RESPIRATORY (INHALATION) at 19:33

## 2020-12-27 RX ADMIN — Medication SCH MG: at 11:30

## 2020-12-27 RX ADMIN — Medication SCH STRIP: at 06:01

## 2020-12-27 RX ADMIN — HUMAN INSULIN SCH UNITS: 100 INJECTION, SOLUTION SUBCUTANEOUS at 22:26

## 2020-12-27 RX ADMIN — ALBUTEROL SULFATE PRN MCG: 108 AEROSOL, METERED RESPIRATORY (INHALATION) at 19:33

## 2020-12-27 RX ADMIN — NYSTATIN SCH ML: 500000 SUSPENSION ORAL at 06:01

## 2020-12-27 RX ADMIN — NYSTATIN SCH ML: 500000 SUSPENSION ORAL at 13:00

## 2020-12-27 RX ADMIN — FLUTICASONE PROPIONATE SCH MCG: 50 SPRAY, METERED NASAL at 21:25

## 2020-12-27 RX ADMIN — Medication SCH UNIT: at 11:30

## 2020-12-27 RX ADMIN — ZINC SULFATE CAP 220 MG (50 MG ELEMENTAL ZN) SCH MG: 220 (50 ZN) CAP at 11:30

## 2020-12-27 RX ADMIN — FUROSEMIDE SCH MG: 10 INJECTION, SOLUTION INTRAMUSCULAR; INTRAVENOUS at 11:00

## 2020-12-27 RX ADMIN — ENOXAPARIN SODIUM SCH MG: 60 INJECTION SUBCUTANEOUS at 11:30

## 2020-12-27 RX ADMIN — NYSTATIN SCH ML: 500000 SUSPENSION ORAL at 21:22

## 2020-12-27 RX ADMIN — POTASSIUM CHLORIDE SCH MEQ: 1500 TABLET, EXTENDED RELEASE ORAL at 11:30

## 2020-12-27 RX ADMIN — Medication SCH STRIP: at 00:05

## 2020-12-27 RX ADMIN — TEMAZEPAM PRN MG: 15 CAPSULE ORAL at 23:56

## 2020-12-27 RX ADMIN — POTASSIUM CHLORIDE SCH MEQ: 1500 TABLET, EXTENDED RELEASE ORAL at 21:21

## 2020-12-27 RX ADMIN — Medication SCH STRIP: at 18:12

## 2020-12-27 RX ADMIN — FAMOTIDINE SCH MG: 20 TABLET, FILM COATED ORAL at 11:30

## 2020-12-27 RX ADMIN — TEMAZEPAM PRN MG: 15 CAPSULE ORAL at 00:04

## 2020-12-27 RX ADMIN — Medication SCH ML: at 13:01

## 2020-12-27 RX ADMIN — HUMAN INSULIN SCH UNITS: 100 INJECTION, SOLUTION SUBCUTANEOUS at 06:03

## 2020-12-27 RX ADMIN — MONTELUKAST SODIUM SCH MG: 10 TABLET, FILM COATED ORAL at 21:25

## 2020-12-27 RX ADMIN — FLUTICASONE PROPIONATE SCH MCG: 50 SPRAY, METERED NASAL at 11:00

## 2020-12-28 VITALS — SYSTOLIC BLOOD PRESSURE: 121 MMHG | DIASTOLIC BLOOD PRESSURE: 76 MMHG

## 2020-12-28 VITALS — SYSTOLIC BLOOD PRESSURE: 106 MMHG | DIASTOLIC BLOOD PRESSURE: 54 MMHG

## 2020-12-28 VITALS — DIASTOLIC BLOOD PRESSURE: 55 MMHG | SYSTOLIC BLOOD PRESSURE: 131 MMHG

## 2020-12-28 LAB
ANION GAP SERPL CALCULATED.3IONS-SCNC: 6 MMOL/L (ref 5–15)
BUN SERPL-MCNC: 26 MG/DL (ref 7–18)
BUN/CREAT SERPL: 45.6
CALCIUM SERPL-MCNC: 9.3 MG/DL (ref 8.5–10.1)
CHLORIDE SERPL-SCNC: 100 MMOL/L (ref 98–107)
CO2 SERPL-SCNC: 30 MMOL/L (ref 21–32)
GLUCOSE SERPL-MCNC: 144 MG/DL (ref 74–106)
HCT VFR BLD AUTO: 39.4 % (ref 36–46)
HGB BLD-MCNC: 12.9 G/DL (ref 12.2–16.2)
MCH RBC QN AUTO: 25.3 PG (ref 28–32)
MCV RBC AUTO: 77.3 FL (ref 80–100)
POTASSIUM SERPL-SCNC: 4.7 MMOL/L (ref 3.5–5.1)
SODIUM SERPL-SCNC: 136 MMOL/L (ref 136–145)

## 2020-12-28 RX ADMIN — Medication SCH MG: at 10:29

## 2020-12-28 RX ADMIN — FAMOTIDINE SCH MG: 20 TABLET, FILM COATED ORAL at 21:58

## 2020-12-28 RX ADMIN — Medication SCH STRIP: at 12:46

## 2020-12-28 RX ADMIN — FLUCONAZOLE IN SODIUM CHLORIDE SCH MLS/HR: 2 INJECTION, SOLUTION INTRAVENOUS at 10:28

## 2020-12-28 RX ADMIN — ENOXAPARIN SODIUM SCH MG: 60 INJECTION SUBCUTANEOUS at 10:28

## 2020-12-28 RX ADMIN — Medication SCH ML: at 18:00

## 2020-12-28 RX ADMIN — HUMAN INSULIN SCH UNITS: 100 INJECTION, SOLUTION SUBCUTANEOUS at 17:40

## 2020-12-28 RX ADMIN — ZINC SULFATE CAP 220 MG (50 MG ELEMENTAL ZN) SCH MG: 220 (50 ZN) CAP at 10:30

## 2020-12-28 RX ADMIN — BUDESONIDE SCH MCG: 180 AEROSOL, POWDER RESPIRATORY (INHALATION) at 09:49

## 2020-12-28 RX ADMIN — ALBUTEROL SULFATE PRN MCG: 108 AEROSOL, METERED RESPIRATORY (INHALATION) at 09:53

## 2020-12-28 RX ADMIN — FUROSEMIDE SCH MG: 10 INJECTION, SOLUTION INTRAMUSCULAR; INTRAVENOUS at 10:28

## 2020-12-28 RX ADMIN — BUDESONIDE SCH MCG: 180 AEROSOL, POWDER RESPIRATORY (INHALATION) at 18:39

## 2020-12-28 RX ADMIN — MONTELUKAST SODIUM SCH MG: 10 TABLET, FILM COATED ORAL at 21:58

## 2020-12-28 RX ADMIN — Medication SCH UNIT: at 10:29

## 2020-12-28 RX ADMIN — NYSTATIN SCH ML: 500000 SUSPENSION ORAL at 12:50

## 2020-12-28 RX ADMIN — POTASSIUM CHLORIDE SCH MEQ: 1500 TABLET, EXTENDED RELEASE ORAL at 21:56

## 2020-12-28 RX ADMIN — ENOXAPARIN SODIUM SCH MG: 60 INJECTION SUBCUTANEOUS at 21:56

## 2020-12-28 RX ADMIN — NYSTATIN SCH ML: 500000 SUSPENSION ORAL at 21:56

## 2020-12-28 RX ADMIN — POTASSIUM CHLORIDE SCH MEQ: 1500 TABLET, EXTENDED RELEASE ORAL at 10:30

## 2020-12-28 RX ADMIN — NYSTATIN SCH ML: 500000 SUSPENSION ORAL at 18:30

## 2020-12-28 RX ADMIN — SERTRALINE HYDROCHLORIDE SCH MG: 50 TABLET, FILM COATED ORAL at 10:30

## 2020-12-28 RX ADMIN — FAMOTIDINE SCH MG: 20 TABLET, FILM COATED ORAL at 10:30

## 2020-12-28 RX ADMIN — Medication SCH STRIP: at 17:40

## 2020-12-28 RX ADMIN — HUMAN INSULIN SCH UNITS: 100 INJECTION, SOLUTION SUBCUTANEOUS at 06:14

## 2020-12-28 RX ADMIN — FLUTICASONE PROPIONATE SCH MCG: 50 SPRAY, METERED NASAL at 21:58

## 2020-12-28 RX ADMIN — HUMAN INSULIN SCH UNITS: 100 INJECTION, SOLUTION SUBCUTANEOUS at 22:30

## 2020-12-28 RX ADMIN — Medication SCH STRIP: at 06:14

## 2020-12-28 RX ADMIN — Medication SCH STRIP: at 21:59

## 2020-12-28 RX ADMIN — Medication SCH ML: at 12:50

## 2020-12-28 RX ADMIN — ALBUTEROL SULFATE PRN MCG: 108 AEROSOL, METERED RESPIRATORY (INHALATION) at 20:28

## 2020-12-28 RX ADMIN — FLUTICASONE PROPIONATE SCH MCG: 50 SPRAY, METERED NASAL at 10:27

## 2020-12-28 RX ADMIN — NYSTATIN SCH ML: 500000 SUSPENSION ORAL at 06:13

## 2020-12-28 RX ADMIN — Medication SCH ML: at 10:26

## 2020-12-28 RX ADMIN — HUMAN INSULIN SCH UNITS: 100 INJECTION, SOLUTION SUBCUTANEOUS at 12:46

## 2020-12-29 VITALS — SYSTOLIC BLOOD PRESSURE: 125 MMHG | DIASTOLIC BLOOD PRESSURE: 82 MMHG

## 2020-12-29 VITALS — SYSTOLIC BLOOD PRESSURE: 113 MMHG | DIASTOLIC BLOOD PRESSURE: 73 MMHG

## 2020-12-29 VITALS — DIASTOLIC BLOOD PRESSURE: 86 MMHG | SYSTOLIC BLOOD PRESSURE: 125 MMHG

## 2020-12-29 RX ADMIN — HUMAN INSULIN SCH UNITS: 100 INJECTION, SOLUTION SUBCUTANEOUS at 18:11

## 2020-12-29 RX ADMIN — Medication SCH STRIP: at 12:43

## 2020-12-29 RX ADMIN — Medication SCH UNIT: at 08:41

## 2020-12-29 RX ADMIN — POTASSIUM CHLORIDE SCH MEQ: 1500 TABLET, EXTENDED RELEASE ORAL at 20:35

## 2020-12-29 RX ADMIN — Medication SCH ML: at 08:41

## 2020-12-29 RX ADMIN — POTASSIUM CHLORIDE SCH MEQ: 1500 TABLET, EXTENDED RELEASE ORAL at 08:44

## 2020-12-29 RX ADMIN — Medication SCH STRIP: at 18:11

## 2020-12-29 RX ADMIN — FLUCONAZOLE IN SODIUM CHLORIDE SCH MLS/HR: 2 INJECTION, SOLUTION INTRAVENOUS at 09:00

## 2020-12-29 RX ADMIN — FLUTICASONE PROPIONATE SCH MCG: 50 SPRAY, METERED NASAL at 08:45

## 2020-12-29 RX ADMIN — FUROSEMIDE SCH MG: 10 INJECTION, SOLUTION INTRAMUSCULAR; INTRAVENOUS at 08:44

## 2020-12-29 RX ADMIN — NYSTATIN SCH ML: 500000 SUSPENSION ORAL at 20:33

## 2020-12-29 RX ADMIN — ALBUTEROL SULFATE PRN MCG: 108 AEROSOL, METERED RESPIRATORY (INHALATION) at 07:49

## 2020-12-29 RX ADMIN — MONTELUKAST SODIUM SCH MG: 10 TABLET, FILM COATED ORAL at 20:35

## 2020-12-29 RX ADMIN — HUMAN INSULIN SCH UNITS: 100 INJECTION, SOLUTION SUBCUTANEOUS at 12:43

## 2020-12-29 RX ADMIN — HUMAN INSULIN SCH UNITS: 100 INJECTION, SOLUTION SUBCUTANEOUS at 06:30

## 2020-12-29 RX ADMIN — Medication SCH MG: at 08:44

## 2020-12-29 RX ADMIN — ENOXAPARIN SODIUM SCH MG: 60 INJECTION SUBCUTANEOUS at 08:46

## 2020-12-29 RX ADMIN — Medication SCH ML: at 18:13

## 2020-12-29 RX ADMIN — Medication SCH STRIP: at 06:29

## 2020-12-29 RX ADMIN — Medication SCH STRIP: at 20:55

## 2020-12-29 RX ADMIN — ENOXAPARIN SODIUM SCH MG: 60 INJECTION SUBCUTANEOUS at 20:36

## 2020-12-29 RX ADMIN — TEMAZEPAM PRN MG: 15 CAPSULE ORAL at 23:21

## 2020-12-29 RX ADMIN — BUDESONIDE SCH MCG: 180 AEROSOL, POWDER RESPIRATORY (INHALATION) at 20:36

## 2020-12-29 RX ADMIN — Medication SCH ML: at 12:43

## 2020-12-29 RX ADMIN — HUMAN INSULIN SCH UNITS: 100 INJECTION, SOLUTION SUBCUTANEOUS at 23:21

## 2020-12-29 RX ADMIN — FLUTICASONE PROPIONATE SCH MCG: 50 SPRAY, METERED NASAL at 20:36

## 2020-12-29 RX ADMIN — SERTRALINE HYDROCHLORIDE SCH MG: 50 TABLET, FILM COATED ORAL at 08:45

## 2020-12-29 RX ADMIN — FAMOTIDINE SCH MG: 20 TABLET, FILM COATED ORAL at 08:46

## 2020-12-29 RX ADMIN — NYSTATIN SCH ML: 500000 SUSPENSION ORAL at 12:43

## 2020-12-29 RX ADMIN — NYSTATIN SCH ML: 500000 SUSPENSION ORAL at 06:29

## 2020-12-29 RX ADMIN — FAMOTIDINE SCH MG: 20 TABLET, FILM COATED ORAL at 20:34

## 2020-12-29 RX ADMIN — BUDESONIDE SCH MCG: 180 AEROSOL, POWDER RESPIRATORY (INHALATION) at 06:36

## 2020-12-29 RX ADMIN — NYSTATIN SCH ML: 500000 SUSPENSION ORAL at 18:13

## 2020-12-29 RX ADMIN — ZINC SULFATE CAP 220 MG (50 MG ELEMENTAL ZN) SCH MG: 220 (50 ZN) CAP at 08:44

## 2020-12-30 VITALS — SYSTOLIC BLOOD PRESSURE: 108 MMHG | DIASTOLIC BLOOD PRESSURE: 73 MMHG

## 2020-12-30 VITALS — SYSTOLIC BLOOD PRESSURE: 136 MMHG | DIASTOLIC BLOOD PRESSURE: 75 MMHG

## 2020-12-30 VITALS — DIASTOLIC BLOOD PRESSURE: 77 MMHG | SYSTOLIC BLOOD PRESSURE: 121 MMHG

## 2020-12-30 RX ADMIN — Medication SCH MG: at 09:55

## 2020-12-30 RX ADMIN — Medication SCH STRIP: at 23:08

## 2020-12-30 RX ADMIN — Medication SCH ML: at 10:08

## 2020-12-30 RX ADMIN — FLUCONAZOLE IN SODIUM CHLORIDE SCH MLS/HR: 2 INJECTION, SOLUTION INTRAVENOUS at 09:54

## 2020-12-30 RX ADMIN — NYSTATIN SCH ML: 500000 SUSPENSION ORAL at 07:03

## 2020-12-30 RX ADMIN — POTASSIUM CHLORIDE SCH MEQ: 1500 TABLET, EXTENDED RELEASE ORAL at 09:55

## 2020-12-30 RX ADMIN — BUDESONIDE SCH MCG: 180 AEROSOL, POWDER RESPIRATORY (INHALATION) at 06:17

## 2020-12-30 RX ADMIN — HUMAN INSULIN SCH UNITS: 100 INJECTION, SOLUTION SUBCUTANEOUS at 11:54

## 2020-12-30 RX ADMIN — FLUTICASONE PROPIONATE SCH MCG: 50 SPRAY, METERED NASAL at 10:08

## 2020-12-30 RX ADMIN — NYSTATIN SCH ML: 500000 SUSPENSION ORAL at 11:48

## 2020-12-30 RX ADMIN — ZINC SULFATE CAP 220 MG (50 MG ELEMENTAL ZN) SCH MG: 220 (50 ZN) CAP at 09:55

## 2020-12-30 RX ADMIN — HUMAN INSULIN SCH UNITS: 100 INJECTION, SOLUTION SUBCUTANEOUS at 23:09

## 2020-12-30 RX ADMIN — FAMOTIDINE SCH MG: 20 TABLET, FILM COATED ORAL at 23:08

## 2020-12-30 RX ADMIN — Medication SCH UNIT: at 09:55

## 2020-12-30 RX ADMIN — MONTELUKAST SODIUM SCH MG: 10 TABLET, FILM COATED ORAL at 23:08

## 2020-12-30 RX ADMIN — POTASSIUM CHLORIDE SCH MEQ: 1500 TABLET, EXTENDED RELEASE ORAL at 23:08

## 2020-12-30 RX ADMIN — Medication SCH STRIP: at 17:33

## 2020-12-30 RX ADMIN — BUDESONIDE SCH MCG: 180 AEROSOL, POWDER RESPIRATORY (INHALATION) at 21:08

## 2020-12-30 RX ADMIN — HUMAN INSULIN SCH UNITS: 100 INJECTION, SOLUTION SUBCUTANEOUS at 17:33

## 2020-12-30 RX ADMIN — ENOXAPARIN SODIUM SCH MG: 60 INJECTION SUBCUTANEOUS at 23:08

## 2020-12-30 RX ADMIN — HUMAN INSULIN SCH UNITS: 100 INJECTION, SOLUTION SUBCUTANEOUS at 07:07

## 2020-12-30 RX ADMIN — NYSTATIN SCH ML: 500000 SUSPENSION ORAL at 23:07

## 2020-12-30 RX ADMIN — ALBUTEROL SULFATE PRN MCG: 108 AEROSOL, METERED RESPIRATORY (INHALATION) at 07:11

## 2020-12-30 RX ADMIN — FLUTICASONE PROPIONATE SCH MCG: 50 SPRAY, METERED NASAL at 23:07

## 2020-12-30 RX ADMIN — ALBUTEROL SULFATE PRN MCG: 108 AEROSOL, METERED RESPIRATORY (INHALATION) at 21:08

## 2020-12-30 RX ADMIN — SERTRALINE HYDROCHLORIDE SCH MG: 50 TABLET, FILM COATED ORAL at 09:55

## 2020-12-30 RX ADMIN — FUROSEMIDE SCH MG: 10 INJECTION, SOLUTION INTRAMUSCULAR; INTRAVENOUS at 09:58

## 2020-12-30 RX ADMIN — NYSTATIN SCH ML: 500000 SUSPENSION ORAL at 17:33

## 2020-12-30 RX ADMIN — FAMOTIDINE SCH MG: 20 TABLET, FILM COATED ORAL at 09:56

## 2020-12-30 RX ADMIN — Medication SCH ML: at 17:34

## 2020-12-30 RX ADMIN — Medication SCH STRIP: at 11:48

## 2020-12-30 RX ADMIN — ENOXAPARIN SODIUM SCH MG: 60 INJECTION SUBCUTANEOUS at 09:56

## 2020-12-30 RX ADMIN — Medication SCH STRIP: at 07:03

## 2020-12-30 RX ADMIN — Medication SCH ML: at 12:06

## 2020-12-31 VITALS — SYSTOLIC BLOOD PRESSURE: 125 MMHG | DIASTOLIC BLOOD PRESSURE: 77 MMHG

## 2020-12-31 VITALS — SYSTOLIC BLOOD PRESSURE: 105 MMHG | DIASTOLIC BLOOD PRESSURE: 60 MMHG

## 2020-12-31 VITALS — DIASTOLIC BLOOD PRESSURE: 85 MMHG | SYSTOLIC BLOOD PRESSURE: 128 MMHG

## 2020-12-31 LAB
ANION GAP SERPL CALCULATED.3IONS-SCNC: 4 MMOL/L (ref 5–15)
BUN SERPL-MCNC: 26 MG/DL (ref 7–18)
BUN/CREAT SERPL: 51
CALCIUM SERPL-MCNC: 9 MG/DL (ref 8.5–10.1)
CHLORIDE SERPL-SCNC: 101 MMOL/L (ref 98–107)
CO2 SERPL-SCNC: 28 MMOL/L (ref 21–32)
GLUCOSE SERPL-MCNC: 144 MG/DL (ref 74–106)
HCT VFR BLD AUTO: 40.4 % (ref 36–46)
HGB BLD-MCNC: 13.4 G/DL (ref 12.2–16.2)
MCH RBC QN AUTO: 25.9 PG (ref 28–32)
MCV RBC AUTO: 78.3 FL (ref 80–100)
POTASSIUM SERPL-SCNC: 5 MMOL/L (ref 3.5–5.1)
SODIUM SERPL-SCNC: 133 MMOL/L (ref 136–145)

## 2020-12-31 RX ADMIN — ZINC SULFATE CAP 220 MG (50 MG ELEMENTAL ZN) SCH MG: 220 (50 ZN) CAP at 11:28

## 2020-12-31 RX ADMIN — Medication SCH STRIP: at 22:48

## 2020-12-31 RX ADMIN — Medication SCH ML: at 17:12

## 2020-12-31 RX ADMIN — NYSTATIN SCH ML: 500000 SUSPENSION ORAL at 22:47

## 2020-12-31 RX ADMIN — FUROSEMIDE SCH MG: 10 INJECTION, SOLUTION INTRAMUSCULAR; INTRAVENOUS at 11:28

## 2020-12-31 RX ADMIN — Medication SCH ML: at 12:06

## 2020-12-31 RX ADMIN — FLUTICASONE PROPIONATE SCH MCG: 50 SPRAY, METERED NASAL at 22:47

## 2020-12-31 RX ADMIN — NYSTATIN SCH ML: 500000 SUSPENSION ORAL at 05:58

## 2020-12-31 RX ADMIN — HUMAN INSULIN SCH UNITS: 100 INJECTION, SOLUTION SUBCUTANEOUS at 12:07

## 2020-12-31 RX ADMIN — DOXYCYCLINE SCH MG: 100 TABLET ORAL at 22:48

## 2020-12-31 RX ADMIN — NYSTATIN SCH ML: 500000 SUSPENSION ORAL at 17:12

## 2020-12-31 RX ADMIN — Medication SCH MG: at 11:29

## 2020-12-31 RX ADMIN — HUMAN INSULIN SCH UNITS: 100 INJECTION, SOLUTION SUBCUTANEOUS at 22:51

## 2020-12-31 RX ADMIN — HUMAN INSULIN SCH UNITS: 100 INJECTION, SOLUTION SUBCUTANEOUS at 17:02

## 2020-12-31 RX ADMIN — Medication SCH UNIT: at 11:29

## 2020-12-31 RX ADMIN — ENOXAPARIN SODIUM SCH MG: 60 INJECTION SUBCUTANEOUS at 11:30

## 2020-12-31 RX ADMIN — FAMOTIDINE SCH MG: 20 TABLET, FILM COATED ORAL at 11:29

## 2020-12-31 RX ADMIN — MONTELUKAST SODIUM SCH MG: 10 TABLET, FILM COATED ORAL at 22:48

## 2020-12-31 RX ADMIN — BUDESONIDE SCH MCG: 180 AEROSOL, POWDER RESPIRATORY (INHALATION) at 18:54

## 2020-12-31 RX ADMIN — Medication SCH STRIP: at 11:30

## 2020-12-31 RX ADMIN — FLUCONAZOLE IN SODIUM CHLORIDE SCH MLS/HR: 2 INJECTION, SOLUTION INTRAVENOUS at 11:28

## 2020-12-31 RX ADMIN — ALBUTEROL SULFATE PRN MCG: 108 AEROSOL, METERED RESPIRATORY (INHALATION) at 18:54

## 2020-12-31 RX ADMIN — FLUTICASONE PROPIONATE SCH MCG: 50 SPRAY, METERED NASAL at 11:27

## 2020-12-31 RX ADMIN — Medication SCH STRIP: at 17:02

## 2020-12-31 RX ADMIN — Medication SCH STRIP: at 06:40

## 2020-12-31 RX ADMIN — Medication SCH ML: at 07:48

## 2020-12-31 RX ADMIN — NYSTATIN SCH ML: 500000 SUSPENSION ORAL at 12:07

## 2020-12-31 RX ADMIN — HUMAN INSULIN SCH UNITS: 100 INJECTION, SOLUTION SUBCUTANEOUS at 06:40

## 2020-12-31 RX ADMIN — BUDESONIDE SCH MCG: 180 AEROSOL, POWDER RESPIRATORY (INHALATION) at 14:00

## 2020-12-31 RX ADMIN — POTASSIUM CHLORIDE SCH MEQ: 1500 TABLET, EXTENDED RELEASE ORAL at 22:47

## 2020-12-31 RX ADMIN — SERTRALINE HYDROCHLORIDE SCH MG: 50 TABLET, FILM COATED ORAL at 11:30

## 2020-12-31 RX ADMIN — FAMOTIDINE SCH MG: 20 TABLET, FILM COATED ORAL at 22:48

## 2020-12-31 RX ADMIN — ENOXAPARIN SODIUM SCH MG: 60 INJECTION SUBCUTANEOUS at 22:48

## 2020-12-31 RX ADMIN — POTASSIUM CHLORIDE SCH MEQ: 1500 TABLET, EXTENDED RELEASE ORAL at 11:29

## 2021-01-01 VITALS — SYSTOLIC BLOOD PRESSURE: 120 MMHG | DIASTOLIC BLOOD PRESSURE: 74 MMHG

## 2021-01-01 VITALS — DIASTOLIC BLOOD PRESSURE: 54 MMHG | SYSTOLIC BLOOD PRESSURE: 104 MMHG

## 2021-01-01 VITALS — SYSTOLIC BLOOD PRESSURE: 134 MMHG | DIASTOLIC BLOOD PRESSURE: 69 MMHG

## 2021-01-01 RX ADMIN — Medication SCH ML: at 08:06

## 2021-01-01 RX ADMIN — FLUTICASONE PROPIONATE SCH MCG: 50 SPRAY, METERED NASAL at 10:06

## 2021-01-01 RX ADMIN — ALBUTEROL SULFATE PRN MCG: 108 AEROSOL, METERED RESPIRATORY (INHALATION) at 11:07

## 2021-01-01 RX ADMIN — FLUCONAZOLE SCH MG: 100 TABLET ORAL at 10:07

## 2021-01-01 RX ADMIN — FUROSEMIDE SCH MG: 10 INJECTION, SOLUTION INTRAMUSCULAR; INTRAVENOUS at 10:06

## 2021-01-01 RX ADMIN — DOXYCYCLINE SCH MG: 100 TABLET ORAL at 10:08

## 2021-01-01 RX ADMIN — ENOXAPARIN SODIUM SCH MG: 60 INJECTION SUBCUTANEOUS at 22:08

## 2021-01-01 RX ADMIN — Medication SCH ML: at 12:57

## 2021-01-01 RX ADMIN — NYSTATIN SCH ML: 500000 SUSPENSION ORAL at 22:08

## 2021-01-01 RX ADMIN — NYSTATIN SCH ML: 500000 SUSPENSION ORAL at 12:57

## 2021-01-01 RX ADMIN — FLUTICASONE PROPIONATE SCH MCG: 50 SPRAY, METERED NASAL at 22:19

## 2021-01-01 RX ADMIN — MONTELUKAST SODIUM SCH MG: 10 TABLET, FILM COATED ORAL at 22:09

## 2021-01-01 RX ADMIN — NYSTATIN SCH ML: 500000 SUSPENSION ORAL at 17:34

## 2021-01-01 RX ADMIN — SERTRALINE HYDROCHLORIDE SCH MG: 50 TABLET, FILM COATED ORAL at 10:08

## 2021-01-01 RX ADMIN — HUMAN INSULIN SCH UNITS: 100 INJECTION, SOLUTION SUBCUTANEOUS at 22:00

## 2021-01-01 RX ADMIN — HUMAN INSULIN SCH UNITS: 100 INJECTION, SOLUTION SUBCUTANEOUS at 06:44

## 2021-01-01 RX ADMIN — Medication SCH MG: at 10:08

## 2021-01-01 RX ADMIN — ENOXAPARIN SODIUM SCH MG: 60 INJECTION SUBCUTANEOUS at 10:08

## 2021-01-01 RX ADMIN — BUDESONIDE SCH MCG: 180 AEROSOL, POWDER RESPIRATORY (INHALATION) at 11:06

## 2021-01-01 RX ADMIN — POTASSIUM CHLORIDE SCH MEQ: 1500 TABLET, EXTENDED RELEASE ORAL at 10:07

## 2021-01-01 RX ADMIN — DOXYCYCLINE SCH MG: 100 TABLET ORAL at 22:09

## 2021-01-01 RX ADMIN — POTASSIUM CHLORIDE SCH MEQ: 1500 TABLET, EXTENDED RELEASE ORAL at 22:09

## 2021-01-01 RX ADMIN — ZINC SULFATE CAP 220 MG (50 MG ELEMENTAL ZN) SCH MG: 220 (50 ZN) CAP at 10:06

## 2021-01-01 RX ADMIN — Medication SCH ML: at 17:35

## 2021-01-01 RX ADMIN — NYSTATIN SCH ML: 500000 SUSPENSION ORAL at 06:29

## 2021-01-01 RX ADMIN — BUDESONIDE SCH MCG: 180 AEROSOL, POWDER RESPIRATORY (INHALATION) at 10:06

## 2021-01-01 RX ADMIN — Medication SCH UNIT: at 10:08

## 2021-01-01 RX ADMIN — HUMAN INSULIN SCH UNITS: 100 INJECTION, SOLUTION SUBCUTANEOUS at 17:34

## 2021-01-01 RX ADMIN — FAMOTIDINE SCH MG: 20 TABLET, FILM COATED ORAL at 22:09

## 2021-01-01 RX ADMIN — Medication SCH STRIP: at 06:37

## 2021-01-01 RX ADMIN — Medication SCH STRIP: at 11:30

## 2021-01-01 RX ADMIN — ALBUTEROL SULFATE PRN MCG: 108 AEROSOL, METERED RESPIRATORY (INHALATION) at 19:07

## 2021-01-01 RX ADMIN — FAMOTIDINE SCH MG: 20 TABLET, FILM COATED ORAL at 10:08

## 2021-01-01 RX ADMIN — Medication SCH STRIP: at 22:00

## 2021-01-01 RX ADMIN — HUMAN INSULIN SCH UNITS: 100 INJECTION, SOLUTION SUBCUTANEOUS at 12:59

## 2021-01-01 RX ADMIN — Medication SCH STRIP: at 16:52

## 2021-01-01 RX ADMIN — TEMAZEPAM PRN MG: 15 CAPSULE ORAL at 01:20

## 2021-01-02 VITALS — SYSTOLIC BLOOD PRESSURE: 126 MMHG | DIASTOLIC BLOOD PRESSURE: 85 MMHG

## 2021-01-02 VITALS — DIASTOLIC BLOOD PRESSURE: 73 MMHG | SYSTOLIC BLOOD PRESSURE: 117 MMHG

## 2021-01-02 VITALS — SYSTOLIC BLOOD PRESSURE: 136 MMHG | DIASTOLIC BLOOD PRESSURE: 77 MMHG

## 2021-01-02 LAB
ALBUMIN SERPL-MCNC: 2.5 G/DL (ref 3.4–5)
ALP SERPL-CCNC: 111 U/L (ref 45–117)
ALT SERPL-CCNC: 171 U/L (ref 13–56)
ANION GAP SERPL CALCULATED.3IONS-SCNC: 7 MMOL/L (ref 5–15)
BILIRUB SERPL-MCNC: 0.4 MG/DL (ref 0.2–1)
BUN SERPL-MCNC: 29 MG/DL (ref 7–18)
BUN/CREAT SERPL: 53.7
CALCIUM SERPL-MCNC: 9.2 MG/DL (ref 8.5–10.1)
CHLORIDE SERPL-SCNC: 103 MMOL/L (ref 98–107)
CO2 SERPL-SCNC: 25 MMOL/L (ref 21–32)
GLUCOSE SERPL-MCNC: 143 MG/DL (ref 74–106)
HCT VFR BLD AUTO: 42.3 % (ref 36–46)
HGB BLD-MCNC: 14 G/DL (ref 12.2–16.2)
MCH RBC QN AUTO: 25.8 PG (ref 28–32)
MCV RBC AUTO: 77.7 FL (ref 80–100)
POTASSIUM SERPL-SCNC: 4.5 MMOL/L (ref 3.5–5.1)
PROT SERPL-MCNC: 6.5 G/DL (ref 6.4–8.2)
SODIUM SERPL-SCNC: 135 MMOL/L (ref 136–145)

## 2021-01-02 RX ADMIN — Medication SCH STRIP: at 16:53

## 2021-01-02 RX ADMIN — NYSTATIN SCH ML: 500000 SUSPENSION ORAL at 05:46

## 2021-01-02 RX ADMIN — Medication SCH MG: at 10:17

## 2021-01-02 RX ADMIN — BUDESONIDE SCH MCG: 180 AEROSOL, POWDER RESPIRATORY (INHALATION) at 19:21

## 2021-01-02 RX ADMIN — FLUTICASONE PROPIONATE SCH MCG: 50 SPRAY, METERED NASAL at 10:16

## 2021-01-02 RX ADMIN — FLUCONAZOLE SCH MG: 100 TABLET ORAL at 10:17

## 2021-01-02 RX ADMIN — NYSTATIN SCH ML: 500000 SUSPENSION ORAL at 11:41

## 2021-01-02 RX ADMIN — Medication SCH ML: at 11:41

## 2021-01-02 RX ADMIN — FUROSEMIDE SCH MG: 10 INJECTION, SOLUTION INTRAMUSCULAR; INTRAVENOUS at 10:17

## 2021-01-02 RX ADMIN — Medication SCH ML: at 16:53

## 2021-01-02 RX ADMIN — ZINC SULFATE CAP 220 MG (50 MG ELEMENTAL ZN) SCH MG: 220 (50 ZN) CAP at 10:17

## 2021-01-02 RX ADMIN — HUMAN INSULIN SCH UNITS: 100 INJECTION, SOLUTION SUBCUTANEOUS at 21:48

## 2021-01-02 RX ADMIN — DOXYCYCLINE SCH MG: 100 TABLET ORAL at 10:17

## 2021-01-02 RX ADMIN — Medication SCH STRIP: at 11:40

## 2021-01-02 RX ADMIN — HUMAN INSULIN SCH UNITS: 100 INJECTION, SOLUTION SUBCUTANEOUS at 16:56

## 2021-01-02 RX ADMIN — BUDESONIDE SCH MCG: 180 AEROSOL, POWDER RESPIRATORY (INHALATION) at 11:26

## 2021-01-02 RX ADMIN — FLUTICASONE PROPIONATE SCH MCG: 50 SPRAY, METERED NASAL at 21:48

## 2021-01-02 RX ADMIN — Medication SCH ML: at 10:18

## 2021-01-02 RX ADMIN — MONTELUKAST SODIUM SCH MG: 10 TABLET, FILM COATED ORAL at 21:37

## 2021-01-02 RX ADMIN — FAMOTIDINE SCH MG: 20 TABLET, FILM COATED ORAL at 10:17

## 2021-01-02 RX ADMIN — HUMAN INSULIN SCH UNITS: 100 INJECTION, SOLUTION SUBCUTANEOUS at 06:16

## 2021-01-02 RX ADMIN — POTASSIUM CHLORIDE SCH MEQ: 1500 TABLET, EXTENDED RELEASE ORAL at 10:17

## 2021-01-02 RX ADMIN — FAMOTIDINE SCH MG: 20 TABLET, FILM COATED ORAL at 21:37

## 2021-01-02 RX ADMIN — Medication SCH UNIT: at 10:18

## 2021-01-02 RX ADMIN — BUDESONIDE SCH MCG: 180 AEROSOL, POWDER RESPIRATORY (INHALATION) at 12:01

## 2021-01-02 RX ADMIN — ENOXAPARIN SODIUM SCH MG: 60 INJECTION SUBCUTANEOUS at 10:18

## 2021-01-02 RX ADMIN — HUMAN INSULIN SCH UNITS: 100 INJECTION, SOLUTION SUBCUTANEOUS at 11:40

## 2021-01-02 RX ADMIN — Medication SCH STRIP: at 05:46

## 2021-01-02 RX ADMIN — NYSTATIN SCH ML: 500000 SUSPENSION ORAL at 16:56

## 2021-01-02 RX ADMIN — Medication SCH STRIP: at 21:37

## 2021-01-02 RX ADMIN — NYSTATIN SCH ML: 500000 SUSPENSION ORAL at 21:36

## 2021-01-02 RX ADMIN — POTASSIUM CHLORIDE SCH MEQ: 1500 TABLET, EXTENDED RELEASE ORAL at 21:37

## 2021-01-02 RX ADMIN — ENOXAPARIN SODIUM SCH MG: 60 INJECTION SUBCUTANEOUS at 21:37

## 2021-01-02 RX ADMIN — SERTRALINE HYDROCHLORIDE SCH MG: 50 TABLET, FILM COATED ORAL at 10:18

## 2021-01-02 RX ADMIN — ALBUTEROL SULFATE PRN MCG: 108 AEROSOL, METERED RESPIRATORY (INHALATION) at 19:22

## 2021-01-03 VITALS — DIASTOLIC BLOOD PRESSURE: 82 MMHG | SYSTOLIC BLOOD PRESSURE: 126 MMHG

## 2021-01-03 VITALS — DIASTOLIC BLOOD PRESSURE: 56 MMHG | SYSTOLIC BLOOD PRESSURE: 101 MMHG

## 2021-01-03 LAB
HCT VFR BLD AUTO: 43.6 % (ref 36–46)
HGB BLD-MCNC: 14.4 G/DL (ref 12.2–16.2)
MCH RBC QN AUTO: 25.7 PG (ref 28–32)
MCV RBC AUTO: 78 FL (ref 80–100)

## 2021-01-03 RX ADMIN — FAMOTIDINE SCH MG: 20 TABLET, FILM COATED ORAL at 10:27

## 2021-01-03 RX ADMIN — FLUTICASONE PROPIONATE SCH MCG: 50 SPRAY, METERED NASAL at 22:22

## 2021-01-03 RX ADMIN — Medication SCH STRIP: at 06:14

## 2021-01-03 RX ADMIN — Medication SCH STRIP: at 22:23

## 2021-01-03 RX ADMIN — HUMAN INSULIN SCH UNITS: 100 INJECTION, SOLUTION SUBCUTANEOUS at 17:08

## 2021-01-03 RX ADMIN — ALBUTEROL SULFATE PRN MCG: 108 AEROSOL, METERED RESPIRATORY (INHALATION) at 08:49

## 2021-01-03 RX ADMIN — POTASSIUM CHLORIDE SCH MEQ: 1500 TABLET, EXTENDED RELEASE ORAL at 22:23

## 2021-01-03 RX ADMIN — POTASSIUM CHLORIDE SCH MEQ: 1500 TABLET, EXTENDED RELEASE ORAL at 10:25

## 2021-01-03 RX ADMIN — FLUTICASONE PROPIONATE SCH MCG: 50 SPRAY, METERED NASAL at 10:25

## 2021-01-03 RX ADMIN — Medication SCH UNIT: at 10:27

## 2021-01-03 RX ADMIN — Medication SCH STRIP: at 11:59

## 2021-01-03 RX ADMIN — Medication SCH STRIP: at 17:07

## 2021-01-03 RX ADMIN — SERTRALINE HYDROCHLORIDE SCH MG: 50 TABLET, FILM COATED ORAL at 10:27

## 2021-01-03 RX ADMIN — ENOXAPARIN SODIUM SCH MG: 60 INJECTION SUBCUTANEOUS at 10:27

## 2021-01-03 RX ADMIN — Medication SCH ML: at 10:28

## 2021-01-03 RX ADMIN — SODIUM CHLORIDE SCH MLS/HR: 9 INJECTION, SOLUTION INTRAVENOUS at 11:59

## 2021-01-03 RX ADMIN — TEMAZEPAM PRN MG: 15 CAPSULE ORAL at 23:03

## 2021-01-03 RX ADMIN — NYSTATIN SCH ML: 500000 SUSPENSION ORAL at 06:17

## 2021-01-03 RX ADMIN — ERTAPENEM SODIUM SCH MLS/HR: 1 INJECTION, POWDER, LYOPHILIZED, FOR SOLUTION INTRAMUSCULAR; INTRAVENOUS at 10:25

## 2021-01-03 RX ADMIN — FAMOTIDINE SCH MG: 20 TABLET, FILM COATED ORAL at 22:23

## 2021-01-03 RX ADMIN — Medication SCH MG: at 10:28

## 2021-01-03 RX ADMIN — FUROSEMIDE SCH MG: 10 INJECTION, SOLUTION INTRAMUSCULAR; INTRAVENOUS at 10:26

## 2021-01-03 RX ADMIN — NYSTATIN SCH ML: 500000 SUSPENSION ORAL at 12:00

## 2021-01-03 RX ADMIN — HUMAN INSULIN SCH UNITS: 100 INJECTION, SOLUTION SUBCUTANEOUS at 12:00

## 2021-01-03 RX ADMIN — ALBUTEROL SULFATE PRN MCG: 108 AEROSOL, METERED RESPIRATORY (INHALATION) at 19:02

## 2021-01-03 RX ADMIN — Medication SCH ML: at 17:20

## 2021-01-03 RX ADMIN — Medication SCH ML: at 12:04

## 2021-01-03 RX ADMIN — ZINC SULFATE CAP 220 MG (50 MG ELEMENTAL ZN) SCH MG: 220 (50 ZN) CAP at 10:27

## 2021-01-03 RX ADMIN — NYSTATIN SCH ML: 500000 SUSPENSION ORAL at 17:20

## 2021-01-03 RX ADMIN — NYSTATIN SCH ML: 500000 SUSPENSION ORAL at 22:23

## 2021-01-03 RX ADMIN — HUMAN INSULIN SCH UNITS: 100 INJECTION, SOLUTION SUBCUTANEOUS at 06:14

## 2021-01-03 RX ADMIN — BUDESONIDE SCH MCG: 180 AEROSOL, POWDER RESPIRATORY (INHALATION) at 19:02

## 2021-01-03 RX ADMIN — HUMAN INSULIN SCH UNITS: 100 INJECTION, SOLUTION SUBCUTANEOUS at 22:29

## 2021-01-03 RX ADMIN — MONTELUKAST SODIUM SCH MG: 10 TABLET, FILM COATED ORAL at 22:23

## 2021-01-03 RX ADMIN — ENOXAPARIN SODIUM SCH MG: 60 INJECTION SUBCUTANEOUS at 22:23

## 2021-01-04 VITALS — SYSTOLIC BLOOD PRESSURE: 131 MMHG | DIASTOLIC BLOOD PRESSURE: 82 MMHG

## 2021-01-04 VITALS — DIASTOLIC BLOOD PRESSURE: 82 MMHG | SYSTOLIC BLOOD PRESSURE: 131 MMHG

## 2021-01-04 VITALS — DIASTOLIC BLOOD PRESSURE: 64 MMHG | SYSTOLIC BLOOD PRESSURE: 135 MMHG

## 2021-01-04 LAB
ALBUMIN SERPL-MCNC: 2.3 G/DL (ref 3.4–5)
ALP SERPL-CCNC: 107 U/L (ref 45–117)
ALT SERPL-CCNC: 196 U/L (ref 13–56)
ANION GAP SERPL CALCULATED.3IONS-SCNC: 8 MMOL/L (ref 5–15)
APTT PPP: 26.8 SEC (ref 23–31.2)
BILIRUB SERPL-MCNC: 0.3 MG/DL (ref 0.2–1)
BUN SERPL-MCNC: 25 MG/DL (ref 7–18)
BUN/CREAT SERPL: 51
CALCIUM SERPL-MCNC: 9.1 MG/DL (ref 8.5–10.1)
CHLORIDE SERPL-SCNC: 104 MMOL/L (ref 98–107)
CO2 SERPL-SCNC: 25 MMOL/L (ref 21–32)
GLUCOSE SERPL-MCNC: 124 MG/DL (ref 74–106)
HCT VFR BLD AUTO: 43.8 % (ref 36–46)
HGB BLD-MCNC: 14.3 G/DL (ref 12.2–16.2)
INR PPP: 0.97 (ref 0.9–1.15)
MCH RBC QN AUTO: 25.5 PG (ref 28–32)
MCV RBC AUTO: 78.3 FL (ref 80–100)
POTASSIUM SERPL-SCNC: 4.8 MMOL/L (ref 3.5–5.1)
PROT SERPL-MCNC: 6.3 G/DL (ref 6.4–8.2)
SODIUM SERPL-SCNC: 137 MMOL/L (ref 136–145)

## 2021-01-04 RX ADMIN — SODIUM CHLORIDE SCH MLS/HR: 9 INJECTION, SOLUTION INTRAVENOUS at 11:16

## 2021-01-04 RX ADMIN — Medication SCH MG: at 11:15

## 2021-01-04 RX ADMIN — FAMOTIDINE SCH MG: 20 TABLET, FILM COATED ORAL at 11:15

## 2021-01-04 RX ADMIN — FLUTICASONE PROPIONATE SCH MCG: 50 SPRAY, METERED NASAL at 11:13

## 2021-01-04 RX ADMIN — Medication SCH STRIP: at 11:30

## 2021-01-04 RX ADMIN — POTASSIUM CHLORIDE SCH MEQ: 1500 TABLET, EXTENDED RELEASE ORAL at 11:15

## 2021-01-04 RX ADMIN — Medication SCH ML: at 11:13

## 2021-01-04 RX ADMIN — HUMAN INSULIN SCH UNITS: 100 INJECTION, SOLUTION SUBCUTANEOUS at 22:00

## 2021-01-04 RX ADMIN — NYSTATIN SCH ML: 500000 SUSPENSION ORAL at 06:14

## 2021-01-04 RX ADMIN — BUDESONIDE SCH MCG: 180 AEROSOL, POWDER RESPIRATORY (INHALATION) at 22:00

## 2021-01-04 RX ADMIN — ALBUTEROL SULFATE PRN MCG: 108 AEROSOL, METERED RESPIRATORY (INHALATION) at 07:00

## 2021-01-04 RX ADMIN — ERTAPENEM SODIUM SCH MLS/HR: 1 INJECTION, POWDER, LYOPHILIZED, FOR SOLUTION INTRAMUSCULAR; INTRAVENOUS at 11:14

## 2021-01-04 RX ADMIN — Medication SCH ML: at 12:00

## 2021-01-04 RX ADMIN — Medication SCH STRIP: at 06:15

## 2021-01-04 RX ADMIN — FLUTICASONE PROPIONATE SCH MCG: 50 SPRAY, METERED NASAL at 21:57

## 2021-01-04 RX ADMIN — FUROSEMIDE SCH MG: 10 INJECTION, SOLUTION INTRAMUSCULAR; INTRAVENOUS at 11:14

## 2021-01-04 RX ADMIN — ZINC SULFATE CAP 220 MG (50 MG ELEMENTAL ZN) SCH MG: 220 (50 ZN) CAP at 11:14

## 2021-01-04 RX ADMIN — Medication SCH UNIT: at 11:15

## 2021-01-04 RX ADMIN — HUMAN INSULIN SCH UNITS: 100 INJECTION, SOLUTION SUBCUTANEOUS at 18:51

## 2021-01-04 RX ADMIN — MORPHINE SULFATE PRN MG: 2 INJECTION, SOLUTION INTRAMUSCULAR; INTRAVENOUS at 11:00

## 2021-01-04 RX ADMIN — NYSTATIN SCH ML: 500000 SUSPENSION ORAL at 13:06

## 2021-01-04 RX ADMIN — HUMAN INSULIN SCH UNITS: 100 INJECTION, SOLUTION SUBCUTANEOUS at 06:15

## 2021-01-04 RX ADMIN — HUMAN INSULIN SCH UNITS: 100 INJECTION, SOLUTION SUBCUTANEOUS at 13:07

## 2021-01-04 RX ADMIN — SERTRALINE HYDROCHLORIDE SCH MG: 50 TABLET, FILM COATED ORAL at 11:15

## 2021-01-04 RX ADMIN — BUDESONIDE SCH MCG: 180 AEROSOL, POWDER RESPIRATORY (INHALATION) at 06:27

## 2021-01-04 RX ADMIN — Medication SCH STRIP: at 17:00

## 2021-01-04 RX ADMIN — ENOXAPARIN SODIUM SCH MG: 60 INJECTION SUBCUTANEOUS at 11:15

## 2021-01-04 RX ADMIN — Medication SCH STRIP: at 22:00

## 2021-01-04 RX ADMIN — SODIUM CHLORIDE, SODIUM LACTATE, POTASSIUM CHLORIDE, AND CALCIUM CHLORIDE SCH MLS/HR: .6; .31; .03; .02 INJECTION, SOLUTION INTRAVENOUS at 23:08

## 2021-01-05 VITALS — DIASTOLIC BLOOD PRESSURE: 75 MMHG | SYSTOLIC BLOOD PRESSURE: 125 MMHG

## 2021-01-05 VITALS — DIASTOLIC BLOOD PRESSURE: 80 MMHG | SYSTOLIC BLOOD PRESSURE: 133 MMHG

## 2021-01-05 VITALS — SYSTOLIC BLOOD PRESSURE: 111 MMHG | DIASTOLIC BLOOD PRESSURE: 64 MMHG

## 2021-01-05 LAB
ALBUMIN SERPL-MCNC: 2.3 G/DL (ref 3.4–5)
ALP SERPL-CCNC: 109 U/L (ref 45–117)
ALT SERPL-CCNC: 189 U/L (ref 13–56)
ANION GAP SERPL CALCULATED.3IONS-SCNC: 7 MMOL/L (ref 5–15)
BILIRUB DIRECT SERPL-MCNC: 0.1 MG/DL (ref 0–0.2)
BILIRUB SERPL-MCNC: 0.4 MG/DL (ref 0.2–1)
BUN SERPL-MCNC: 27 MG/DL (ref 7–18)
BUN/CREAT SERPL: 46.6
CALCIUM SERPL-MCNC: 9 MG/DL (ref 8.5–10.1)
CHLORIDE SERPL-SCNC: 103 MMOL/L (ref 98–107)
CO2 SERPL-SCNC: 27 MMOL/L (ref 21–32)
GLUCOSE SERPL-MCNC: 120 MG/DL (ref 74–106)
HCT VFR BLD AUTO: 43.1 % (ref 36–46)
HGB BLD-MCNC: 14.2 G/DL (ref 12.2–16.2)
LIPASE SERPL-CCNC: 63 U/L (ref 73–393)
MAGNESIUM SERPL-MCNC: 2.2 MG/DL (ref 1.6–2.6)
MCH RBC QN AUTO: 25.8 PG (ref 28–32)
MCV RBC AUTO: 78.2 FL (ref 80–100)
POTASSIUM SERPL-SCNC: 4.5 MMOL/L (ref 3.5–5.1)
PROT SERPL-MCNC: 6.2 G/DL (ref 6.4–8.2)
SODIUM SERPL-SCNC: 137 MMOL/L (ref 136–145)
TRIGL SERPL-MCNC: 262 MG/DL (ref ?–150)

## 2021-01-05 RX ADMIN — HUMAN INSULIN SCH UNITS: 100 INJECTION, SOLUTION SUBCUTANEOUS at 17:00

## 2021-01-05 RX ADMIN — HUMAN INSULIN SCH UNITS: 100 INJECTION, SOLUTION SUBCUTANEOUS at 21:43

## 2021-01-05 RX ADMIN — SODIUM CHLORIDE, SODIUM LACTATE, POTASSIUM CHLORIDE, AND CALCIUM CHLORIDE SCH MLS/HR: .6; .31; .03; .02 INJECTION, SOLUTION INTRAVENOUS at 11:00

## 2021-01-05 RX ADMIN — LEUCINE, PHENYLALANINE, LYSINE, METHIONINE, ISOLEUCINE, VALINE, HISTIDINE, THREONINE, TRYPTOPHAN, ALANINE, GLYCINE, ARGININE, PROLINE, SERINE, TYROSINE, DEXTROSE NR MLS/HR: 311; 238; 247; 170; 255; 247; 204; 179; 77; 880; 438; 489; 289; 213; 17; 10 INJECTION INTRAVENOUS at 20:28

## 2021-01-05 RX ADMIN — SODIUM CHLORIDE SCH MLS/HR: 9 INJECTION, SOLUTION INTRAVENOUS at 11:00

## 2021-01-05 RX ADMIN — MORPHINE SULFATE PRN MG: 2 INJECTION, SOLUTION INTRAMUSCULAR; INTRAVENOUS at 23:24

## 2021-01-05 RX ADMIN — Medication SCH STRIP: at 23:23

## 2021-01-05 RX ADMIN — PIPERACILLIN SODIUM AND TAZOBACTAM SODIUM SCH MLS/HR: .375; 3 INJECTION, POWDER, LYOPHILIZED, FOR SOLUTION INTRAVENOUS at 18:24

## 2021-01-05 RX ADMIN — PIPERACILLIN SODIUM AND TAZOBACTAM SODIUM SCH MLS/HR: .375; 3 INJECTION, POWDER, LYOPHILIZED, FOR SOLUTION INTRAVENOUS at 00:03

## 2021-01-05 RX ADMIN — BUDESONIDE SCH MCG: 180 AEROSOL, POWDER RESPIRATORY (INHALATION) at 20:02

## 2021-01-05 RX ADMIN — MORPHINE SULFATE PRN MG: 2 INJECTION, SOLUTION INTRAMUSCULAR; INTRAVENOUS at 00:04

## 2021-01-05 RX ADMIN — ONDANSETRON HYDROCHLORIDE PRN MG: 2 INJECTION, SOLUTION INTRAMUSCULAR; INTRAVENOUS at 23:24

## 2021-01-05 RX ADMIN — FLUTICASONE PROPIONATE SCH MCG: 50 SPRAY, METERED NASAL at 10:00

## 2021-01-05 RX ADMIN — PIPERACILLIN SODIUM AND TAZOBACTAM SODIUM SCH MLS/HR: .375; 3 INJECTION, POWDER, LYOPHILIZED, FOR SOLUTION INTRAVENOUS at 12:00

## 2021-01-05 RX ADMIN — Medication SCH STRIP: at 21:43

## 2021-01-05 RX ADMIN — SODIUM CHLORIDE, SODIUM LACTATE, POTASSIUM CHLORIDE, AND CALCIUM CHLORIDE SCH MLS/HR: .6; .31; .03; .02 INJECTION, SOLUTION INTRAVENOUS at 17:00

## 2021-01-05 RX ADMIN — FLUTICASONE PROPIONATE SCH MCG: 50 SPRAY, METERED NASAL at 21:43

## 2021-01-05 RX ADMIN — ALBUTEROL SULFATE PRN MCG: 108 AEROSOL, METERED RESPIRATORY (INHALATION) at 19:20

## 2021-01-05 RX ADMIN — ALBUTEROL SULFATE PRN MCG: 108 AEROSOL, METERED RESPIRATORY (INHALATION) at 11:40

## 2021-01-05 RX ADMIN — MORPHINE SULFATE PRN MG: 2 INJECTION, SOLUTION INTRAMUSCULAR; INTRAVENOUS at 18:50

## 2021-01-05 RX ADMIN — HUMAN INSULIN SCH UNITS: 100 INJECTION, SOLUTION SUBCUTANEOUS at 23:19

## 2021-01-05 RX ADMIN — BUDESONIDE SCH MCG: 180 AEROSOL, POWDER RESPIRATORY (INHALATION) at 11:40

## 2021-01-05 RX ADMIN — PANTOPRAZOLE SODIUM SCH MG: 40 INJECTION, POWDER, FOR SOLUTION INTRAVENOUS at 10:00

## 2021-01-05 RX ADMIN — HUMAN INSULIN SCH UNITS: 100 INJECTION, SOLUTION SUBCUTANEOUS at 05:54

## 2021-01-05 RX ADMIN — HUMAN INSULIN SCH UNITS: 100 INJECTION, SOLUTION SUBCUTANEOUS at 11:30

## 2021-01-05 RX ADMIN — PIPERACILLIN SODIUM AND TAZOBACTAM SODIUM SCH MLS/HR: .375; 3 INJECTION, POWDER, LYOPHILIZED, FOR SOLUTION INTRAVENOUS at 05:55

## 2021-01-05 RX ADMIN — Medication SCH STRIP: at 17:00

## 2021-01-05 RX ADMIN — PIPERACILLIN SODIUM AND TAZOBACTAM SODIUM SCH MLS/HR: .375; 3 INJECTION, POWDER, LYOPHILIZED, FOR SOLUTION INTRAVENOUS at 23:23

## 2021-01-05 RX ADMIN — Medication SCH STRIP: at 05:54

## 2021-01-05 RX ADMIN — ERTAPENEM SODIUM SCH MLS/HR: 1 INJECTION, POWDER, LYOPHILIZED, FOR SOLUTION INTRAMUSCULAR; INTRAVENOUS at 10:00

## 2021-01-05 RX ADMIN — Medication SCH STRIP: at 11:30

## 2021-01-06 VITALS — DIASTOLIC BLOOD PRESSURE: 75 MMHG | SYSTOLIC BLOOD PRESSURE: 119 MMHG

## 2021-01-06 VITALS — SYSTOLIC BLOOD PRESSURE: 127 MMHG | DIASTOLIC BLOOD PRESSURE: 78 MMHG

## 2021-01-06 VITALS — SYSTOLIC BLOOD PRESSURE: 112 MMHG | DIASTOLIC BLOOD PRESSURE: 64 MMHG

## 2021-01-06 LAB
ALBUMIN SERPL-MCNC: 1.8 G/DL (ref 3.4–5)
ALP SERPL-CCNC: 80 U/L (ref 45–117)
ALT SERPL-CCNC: 124 U/L (ref 13–56)
ANION GAP SERPL CALCULATED.3IONS-SCNC: 5 MMOL/L (ref 5–15)
BILIRUB SERPL-MCNC: 0.4 MG/DL (ref 0.2–1)
BUN SERPL-MCNC: 16 MG/DL (ref 7–18)
BUN/CREAT SERPL: 30.8
CALCIUM SERPL-MCNC: 8.3 MG/DL (ref 8.5–10.1)
CHLORIDE SERPL-SCNC: 108 MMOL/L (ref 98–107)
CO2 SERPL-SCNC: 27 MMOL/L (ref 21–32)
GLUCOSE SERPL-MCNC: 171 MG/DL (ref 74–106)
HCT VFR BLD AUTO: 38.3 % (ref 36–46)
HGB BLD-MCNC: 12.7 G/DL (ref 12.2–16.2)
MAGNESIUM SERPL-MCNC: 2.2 MG/DL (ref 1.6–2.6)
MCH RBC QN AUTO: 26.1 PG (ref 28–32)
MCV RBC AUTO: 78.6 FL (ref 80–100)
POTASSIUM SERPL-SCNC: 4.1 MMOL/L (ref 3.5–5.1)
PROT SERPL-MCNC: 5.4 G/DL (ref 6.4–8.2)
SODIUM SERPL-SCNC: 140 MMOL/L (ref 136–145)

## 2021-01-06 RX ADMIN — HUMAN INSULIN SCH UNITS: 100 INJECTION, SOLUTION SUBCUTANEOUS at 11:39

## 2021-01-06 RX ADMIN — PIPERACILLIN SODIUM AND TAZOBACTAM SODIUM SCH MLS/HR: .375; 3 INJECTION, POWDER, LYOPHILIZED, FOR SOLUTION INTRAVENOUS at 05:36

## 2021-01-06 RX ADMIN — ALBUTEROL SULFATE PRN MCG: 108 AEROSOL, METERED RESPIRATORY (INHALATION) at 07:30

## 2021-01-06 RX ADMIN — LORAZEPAM PRN MG: 2 INJECTION, SOLUTION INTRAMUSCULAR; INTRAVENOUS at 12:39

## 2021-01-06 RX ADMIN — FLUTICASONE PROPIONATE SCH MCG: 50 SPRAY, METERED NASAL at 09:39

## 2021-01-06 RX ADMIN — SODIUM CHLORIDE, SODIUM LACTATE, POTASSIUM CHLORIDE, AND CALCIUM CHLORIDE SCH MLS/HR: .6; .31; .03; .02 INJECTION, SOLUTION INTRAVENOUS at 04:34

## 2021-01-06 RX ADMIN — PANTOPRAZOLE SODIUM SCH MG: 40 INJECTION, POWDER, FOR SOLUTION INTRAVENOUS at 09:39

## 2021-01-06 RX ADMIN — Medication SCH STRIP: at 05:20

## 2021-01-06 RX ADMIN — HUMAN INSULIN SCH UNITS: 100 INJECTION, SOLUTION SUBCUTANEOUS at 17:16

## 2021-01-06 RX ADMIN — FLUTICASONE PROPIONATE SCH MCG: 50 SPRAY, METERED NASAL at 22:05

## 2021-01-06 RX ADMIN — BUDESONIDE SCH MCG: 180 AEROSOL, POWDER RESPIRATORY (INHALATION) at 07:00

## 2021-01-06 RX ADMIN — Medication SCH STRIP: at 11:39

## 2021-01-06 RX ADMIN — LEUCINE, PHENYLALANINE, LYSINE, METHIONINE, ISOLEUCINE, VALINE, HISTIDINE, THREONINE, TRYPTOPHAN, ALANINE, GLYCINE, ARGININE, PROLINE, SERINE, TYROSINE, DEXTROSE NR MLS/HR: 311; 238; 247; 170; 255; 247; 204; 179; 77; 880; 438; 489; 289; 213; 17; 10 INJECTION INTRAVENOUS at 20:15

## 2021-01-06 RX ADMIN — HUMAN INSULIN SCH UNITS: 100 INJECTION, SOLUTION SUBCUTANEOUS at 05:31

## 2021-01-06 RX ADMIN — PIPERACILLIN SODIUM AND TAZOBACTAM SODIUM SCH MLS/HR: .375; 3 INJECTION, POWDER, LYOPHILIZED, FOR SOLUTION INTRAVENOUS at 18:13

## 2021-01-06 RX ADMIN — ALBUTEROL SULFATE PRN MCG: 108 AEROSOL, METERED RESPIRATORY (INHALATION) at 20:02

## 2021-01-06 RX ADMIN — BUDESONIDE SCH MCG: 180 AEROSOL, POWDER RESPIRATORY (INHALATION) at 07:30

## 2021-01-06 RX ADMIN — SODIUM CHLORIDE SCH MLS/HR: 9 INJECTION, SOLUTION INTRAVENOUS at 09:47

## 2021-01-06 RX ADMIN — PANTOPRAZOLE SODIUM SCH MG: 40 INJECTION, POWDER, FOR SOLUTION INTRAVENOUS at 10:00

## 2021-01-06 RX ADMIN — PIPERACILLIN SODIUM AND TAZOBACTAM SODIUM SCH MLS/HR: .375; 3 INJECTION, POWDER, LYOPHILIZED, FOR SOLUTION INTRAVENOUS at 12:00

## 2021-01-06 RX ADMIN — Medication SCH STRIP: at 17:16

## 2021-01-06 RX ADMIN — LORAZEPAM PRN MG: 2 INJECTION, SOLUTION INTRAMUSCULAR; INTRAVENOUS at 18:13

## 2021-01-07 VITALS — SYSTOLIC BLOOD PRESSURE: 117 MMHG | DIASTOLIC BLOOD PRESSURE: 68 MMHG

## 2021-01-07 VITALS — DIASTOLIC BLOOD PRESSURE: 76 MMHG | SYSTOLIC BLOOD PRESSURE: 125 MMHG

## 2021-01-07 VITALS — SYSTOLIC BLOOD PRESSURE: 115 MMHG | DIASTOLIC BLOOD PRESSURE: 75 MMHG

## 2021-01-07 LAB
ALBUMIN SERPL-MCNC: 1.6 G/DL (ref 3.4–5)
ALP SERPL-CCNC: 71 U/L (ref 45–117)
ALT SERPL-CCNC: 85 U/L (ref 13–56)
ANION GAP SERPL CALCULATED.3IONS-SCNC: 6 MMOL/L (ref 5–15)
BILIRUB SERPL-MCNC: 0.4 MG/DL (ref 0.2–1)
BUN SERPL-MCNC: 13 MG/DL (ref 7–18)
BUN/CREAT SERPL: 37.1
CALCIUM SERPL-MCNC: 8 MG/DL (ref 8.5–10.1)
CHLORIDE SERPL-SCNC: 107 MMOL/L (ref 98–107)
CO2 SERPL-SCNC: 29 MMOL/L (ref 21–32)
GLUCOSE SERPL-MCNC: 160 MG/DL (ref 74–106)
HCT VFR BLD AUTO: 36.1 % (ref 36–46)
HGB BLD-MCNC: 11.8 G/DL (ref 12.2–16.2)
MAGNESIUM SERPL-MCNC: 2.1 MG/DL (ref 1.6–2.6)
MCH RBC QN AUTO: 25.4 PG (ref 28–32)
MCV RBC AUTO: 77.9 FL (ref 80–100)
POTASSIUM SERPL-SCNC: 3.3 MMOL/L (ref 3.5–5.1)
PROT SERPL-MCNC: 5.1 G/DL (ref 6.4–8.2)
SODIUM SERPL-SCNC: 142 MMOL/L (ref 136–145)

## 2021-01-07 RX ADMIN — MORPHINE SULFATE PRN MG: 2 INJECTION, SOLUTION INTRAMUSCULAR; INTRAVENOUS at 10:41

## 2021-01-07 RX ADMIN — FLUTICASONE PROPIONATE SCH MCG: 50 SPRAY, METERED NASAL at 10:24

## 2021-01-07 RX ADMIN — Medication SCH STRIP: at 23:48

## 2021-01-07 RX ADMIN — PIPERACILLIN SODIUM AND TAZOBACTAM SODIUM SCH MLS/HR: .375; 3 INJECTION, POWDER, LYOPHILIZED, FOR SOLUTION INTRAVENOUS at 00:39

## 2021-01-07 RX ADMIN — Medication SCH STRIP: at 06:16

## 2021-01-07 RX ADMIN — ALBUTEROL SULFATE PRN MCG: 108 AEROSOL, METERED RESPIRATORY (INHALATION) at 19:55

## 2021-01-07 RX ADMIN — HUMAN INSULIN SCH UNITS: 100 INJECTION, SOLUTION SUBCUTANEOUS at 06:21

## 2021-01-07 RX ADMIN — Medication SCH STRIP: at 00:22

## 2021-01-07 RX ADMIN — SODIUM CHLORIDE SCH MLS/HR: 9 INJECTION, SOLUTION INTRAVENOUS at 11:24

## 2021-01-07 RX ADMIN — PIPERACILLIN SODIUM AND TAZOBACTAM SODIUM SCH MLS/HR: .375; 3 INJECTION, POWDER, LYOPHILIZED, FOR SOLUTION INTRAVENOUS at 13:03

## 2021-01-07 RX ADMIN — HUMAN INSULIN SCH UNITS: 100 INJECTION, SOLUTION SUBCUTANEOUS at 00:29

## 2021-01-07 RX ADMIN — FLUTICASONE PROPIONATE SCH MCG: 50 SPRAY, METERED NASAL at 22:00

## 2021-01-07 RX ADMIN — PIPERACILLIN SODIUM AND TAZOBACTAM SODIUM SCH MLS/HR: .375; 3 INJECTION, POWDER, LYOPHILIZED, FOR SOLUTION INTRAVENOUS at 06:25

## 2021-01-07 RX ADMIN — PIPERACILLIN SODIUM AND TAZOBACTAM SODIUM SCH MLS/HR: .375; 3 INJECTION, POWDER, LYOPHILIZED, FOR SOLUTION INTRAVENOUS at 18:00

## 2021-01-07 RX ADMIN — ALBUTEROL SULFATE PRN MCG: 108 AEROSOL, METERED RESPIRATORY (INHALATION) at 07:00

## 2021-01-07 RX ADMIN — PANTOPRAZOLE SODIUM SCH MG: 40 INJECTION, POWDER, FOR SOLUTION INTRAVENOUS at 10:00

## 2021-01-07 RX ADMIN — HUMAN INSULIN SCH UNITS: 100 INJECTION, SOLUTION SUBCUTANEOUS at 17:54

## 2021-01-07 RX ADMIN — HALOPERIDOL LACTATE PRN MG: 5 INJECTION, SOLUTION INTRAMUSCULAR at 18:54

## 2021-01-07 RX ADMIN — BUDESONIDE SCH MCG: 180 AEROSOL, POWDER RESPIRATORY (INHALATION) at 19:55

## 2021-01-07 RX ADMIN — Medication SCH STRIP: at 17:54

## 2021-01-07 RX ADMIN — HUMAN INSULIN SCH UNITS: 100 INJECTION, SOLUTION SUBCUTANEOUS at 23:52

## 2021-01-07 RX ADMIN — MORPHINE SULFATE PRN MG: 2 INJECTION, SOLUTION INTRAMUSCULAR; INTRAVENOUS at 21:28

## 2021-01-07 RX ADMIN — BUDESONIDE SCH MCG: 180 AEROSOL, POWDER RESPIRATORY (INHALATION) at 10:25

## 2021-01-07 RX ADMIN — Medication SCH STRIP: at 11:55

## 2021-01-07 RX ADMIN — HUMAN INSULIN SCH UNITS: 100 INJECTION, SOLUTION SUBCUTANEOUS at 11:55

## 2021-01-08 VITALS — DIASTOLIC BLOOD PRESSURE: 67 MMHG | SYSTOLIC BLOOD PRESSURE: 148 MMHG

## 2021-01-08 VITALS — DIASTOLIC BLOOD PRESSURE: 64 MMHG | SYSTOLIC BLOOD PRESSURE: 110 MMHG

## 2021-01-08 VITALS — SYSTOLIC BLOOD PRESSURE: 137 MMHG | DIASTOLIC BLOOD PRESSURE: 84 MMHG

## 2021-01-08 LAB
ALBUMIN SERPL-MCNC: 1.6 G/DL (ref 3.4–5)
ALP SERPL-CCNC: 74 U/L (ref 45–117)
ALT SERPL-CCNC: 66 U/L (ref 13–56)
ANION GAP SERPL CALCULATED.3IONS-SCNC: 3 MMOL/L (ref 5–15)
BILIRUB SERPL-MCNC: 0.5 MG/DL (ref 0.2–1)
BUN SERPL-MCNC: 10 MG/DL (ref 7–18)
BUN/CREAT SERPL: 37
CALCIUM SERPL-MCNC: 8.1 MG/DL (ref 8.5–10.1)
CHLORIDE SERPL-SCNC: 107 MMOL/L (ref 98–107)
CO2 SERPL-SCNC: 31 MMOL/L (ref 21–32)
GLUCOSE SERPL-MCNC: 153 MG/DL (ref 74–106)
HCT VFR BLD AUTO: 34.8 % (ref 36–46)
HGB BLD-MCNC: 11.4 G/DL (ref 12.2–16.2)
MAGNESIUM SERPL-MCNC: 2 MG/DL (ref 1.6–2.6)
MCH RBC QN AUTO: 25.6 PG (ref 28–32)
MCV RBC AUTO: 78.1 FL (ref 80–100)
POTASSIUM SERPL-SCNC: 3.1 MMOL/L (ref 3.5–5.1)
PROT SERPL-MCNC: 5.1 G/DL (ref 6.4–8.2)
SODIUM SERPL-SCNC: 141 MMOL/L (ref 136–145)
TRIGL SERPL-MCNC: 117 MG/DL (ref ?–150)

## 2021-01-08 RX ADMIN — FLUTICASONE PROPIONATE SCH MCG: 50 SPRAY, METERED NASAL at 21:24

## 2021-01-08 RX ADMIN — ALBUTEROL SULFATE PRN MCG: 108 AEROSOL, METERED RESPIRATORY (INHALATION) at 22:02

## 2021-01-08 RX ADMIN — POTASSIUM CHLORIDE SCH MLS/HR: 200 INJECTION, SOLUTION INTRAVENOUS at 17:00

## 2021-01-08 RX ADMIN — HALOPERIDOL LACTATE PRN MG: 5 INJECTION, SOLUTION INTRAMUSCULAR at 13:23

## 2021-01-08 RX ADMIN — PIPERACILLIN SODIUM AND TAZOBACTAM SODIUM SCH MLS/HR: .375; 3 INJECTION, POWDER, LYOPHILIZED, FOR SOLUTION INTRAVENOUS at 00:41

## 2021-01-08 RX ADMIN — MORPHINE SULFATE PRN MG: 2 INJECTION, SOLUTION INTRAMUSCULAR; INTRAVENOUS at 16:06

## 2021-01-08 RX ADMIN — PIPERACILLIN SODIUM AND TAZOBACTAM SODIUM SCH MLS/HR: .375; 3 INJECTION, POWDER, LYOPHILIZED, FOR SOLUTION INTRAVENOUS at 12:00

## 2021-01-08 RX ADMIN — BUDESONIDE SCH MCG: 180 AEROSOL, POWDER RESPIRATORY (INHALATION) at 10:00

## 2021-01-08 RX ADMIN — PIPERACILLIN SODIUM AND TAZOBACTAM SODIUM SCH MLS/HR: .375; 3 INJECTION, POWDER, LYOPHILIZED, FOR SOLUTION INTRAVENOUS at 06:13

## 2021-01-08 RX ADMIN — PIPERACILLIN SODIUM AND TAZOBACTAM SODIUM SCH MLS/HR: .375; 3 INJECTION, POWDER, LYOPHILIZED, FOR SOLUTION INTRAVENOUS at 18:00

## 2021-01-08 RX ADMIN — Medication SCH STRIP: at 18:00

## 2021-01-08 RX ADMIN — FLUTICASONE PROPIONATE SCH MCG: 50 SPRAY, METERED NASAL at 13:58

## 2021-01-08 RX ADMIN — HUMAN INSULIN SCH UNITS: 100 INJECTION, SOLUTION SUBCUTANEOUS at 12:00

## 2021-01-08 RX ADMIN — HUMAN INSULIN SCH UNITS: 100 INJECTION, SOLUTION SUBCUTANEOUS at 18:00

## 2021-01-08 RX ADMIN — SODIUM CHLORIDE SCH MLS/HR: 9 INJECTION, SOLUTION INTRAVENOUS at 11:00

## 2021-01-08 RX ADMIN — HUMAN INSULIN SCH UNITS: 100 INJECTION, SOLUTION SUBCUTANEOUS at 06:19

## 2021-01-08 RX ADMIN — Medication SCH STRIP: at 12:00

## 2021-01-08 RX ADMIN — ENOXAPARIN SODIUM SCH MG: 60 INJECTION SUBCUTANEOUS at 21:24

## 2021-01-08 RX ADMIN — PANTOPRAZOLE SODIUM SCH MG: 40 INJECTION, POWDER, FOR SOLUTION INTRAVENOUS at 10:00

## 2021-01-08 RX ADMIN — Medication SCH STRIP: at 06:14

## 2021-01-08 RX ADMIN — BUDESONIDE SCH MCG: 180 AEROSOL, POWDER RESPIRATORY (INHALATION) at 22:02

## 2021-01-08 RX ADMIN — POTASSIUM CHLORIDE SCH MLS/HR: 200 INJECTION, SOLUTION INTRAVENOUS at 11:15

## 2021-01-08 RX ADMIN — MORPHINE SULFATE PRN MG: 2 INJECTION, SOLUTION INTRAMUSCULAR; INTRAVENOUS at 21:24

## 2021-01-09 VITALS — DIASTOLIC BLOOD PRESSURE: 68 MMHG | SYSTOLIC BLOOD PRESSURE: 114 MMHG

## 2021-01-09 VITALS — DIASTOLIC BLOOD PRESSURE: 73 MMHG | SYSTOLIC BLOOD PRESSURE: 129 MMHG

## 2021-01-09 VITALS — DIASTOLIC BLOOD PRESSURE: 71 MMHG | SYSTOLIC BLOOD PRESSURE: 131 MMHG

## 2021-01-09 LAB
ALBUMIN SERPL-MCNC: 1.7 G/DL (ref 3.4–5)
ALP SERPL-CCNC: 78 U/L (ref 45–117)
ALT SERPL-CCNC: 76 U/L (ref 13–56)
ANION GAP SERPL CALCULATED.3IONS-SCNC: 5 MMOL/L (ref 5–15)
BILIRUB SERPL-MCNC: 0.5 MG/DL (ref 0.2–1)
BUN SERPL-MCNC: 13 MG/DL (ref 7–18)
BUN/CREAT SERPL: 44.8
CALCIUM SERPL-MCNC: 8.1 MG/DL (ref 8.5–10.1)
CHLORIDE SERPL-SCNC: 107 MMOL/L (ref 98–107)
CO2 SERPL-SCNC: 30 MMOL/L (ref 21–32)
GLUCOSE SERPL-MCNC: 116 MG/DL (ref 74–106)
HCT VFR BLD AUTO: 34.9 % (ref 36–46)
HGB BLD-MCNC: 11.4 G/DL (ref 12.2–16.2)
MAGNESIUM SERPL-MCNC: 2.2 MG/DL (ref 1.6–2.6)
MCH RBC QN AUTO: 25.6 PG (ref 28–32)
MCV RBC AUTO: 78.3 FL (ref 80–100)
NRBC BLD QL AUTO: 0 %
POTASSIUM SERPL-SCNC: 3.5 MMOL/L (ref 3.5–5.1)
PROT SERPL-MCNC: 5.6 G/DL (ref 6.4–8.2)
SODIUM SERPL-SCNC: 142 MMOL/L (ref 136–145)

## 2021-01-09 RX ADMIN — Medication SCH STRIP: at 06:28

## 2021-01-09 RX ADMIN — HUMAN INSULIN SCH UNITS: 100 INJECTION, SOLUTION SUBCUTANEOUS at 18:00

## 2021-01-09 RX ADMIN — PANTOPRAZOLE SODIUM SCH MG: 40 INJECTION, POWDER, FOR SOLUTION INTRAVENOUS at 10:00

## 2021-01-09 RX ADMIN — ENOXAPARIN SODIUM SCH MG: 60 INJECTION SUBCUTANEOUS at 22:16

## 2021-01-09 RX ADMIN — BUDESONIDE SCH MCG: 180 AEROSOL, POWDER RESPIRATORY (INHALATION) at 20:13

## 2021-01-09 RX ADMIN — PIPERACILLIN SODIUM AND TAZOBACTAM SODIUM SCH MLS/HR: .375; 3 INJECTION, POWDER, LYOPHILIZED, FOR SOLUTION INTRAVENOUS at 00:51

## 2021-01-09 RX ADMIN — FLUTICASONE PROPIONATE SCH MCG: 50 SPRAY, METERED NASAL at 12:25

## 2021-01-09 RX ADMIN — SODIUM CHLORIDE SCH MLS/HR: 9 INJECTION, SOLUTION INTRAVENOUS at 12:26

## 2021-01-09 RX ADMIN — ALBUTEROL SULFATE PRN MCG: 108 AEROSOL, METERED RESPIRATORY (INHALATION) at 20:13

## 2021-01-09 RX ADMIN — FLUTICASONE PROPIONATE SCH MCG: 50 SPRAY, METERED NASAL at 22:16

## 2021-01-09 RX ADMIN — Medication SCH STRIP: at 00:51

## 2021-01-09 RX ADMIN — HUMAN INSULIN SCH UNITS: 100 INJECTION, SOLUTION SUBCUTANEOUS at 00:52

## 2021-01-09 RX ADMIN — Medication SCH STRIP: at 18:00

## 2021-01-09 RX ADMIN — Medication SCH STRIP: at 13:31

## 2021-01-09 RX ADMIN — PIPERACILLIN SODIUM AND TAZOBACTAM SODIUM SCH MLS/HR: .375; 3 INJECTION, POWDER, LYOPHILIZED, FOR SOLUTION INTRAVENOUS at 06:59

## 2021-01-09 RX ADMIN — PIPERACILLIN SODIUM AND TAZOBACTAM SODIUM SCH MLS/HR: .375; 3 INJECTION, POWDER, LYOPHILIZED, FOR SOLUTION INTRAVENOUS at 19:06

## 2021-01-09 RX ADMIN — BUDESONIDE SCH MCG: 180 AEROSOL, POWDER RESPIRATORY (INHALATION) at 08:10

## 2021-01-09 RX ADMIN — HUMAN INSULIN SCH UNITS: 100 INJECTION, SOLUTION SUBCUTANEOUS at 12:00

## 2021-01-09 RX ADMIN — PIPERACILLIN SODIUM AND TAZOBACTAM SODIUM SCH MLS/HR: .375; 3 INJECTION, POWDER, LYOPHILIZED, FOR SOLUTION INTRAVENOUS at 16:27

## 2021-01-09 RX ADMIN — HUMAN INSULIN SCH UNITS: 100 INJECTION, SOLUTION SUBCUTANEOUS at 06:00

## 2021-01-09 RX ADMIN — ENOXAPARIN SODIUM SCH MG: 60 INJECTION SUBCUTANEOUS at 10:00

## 2021-01-09 RX ADMIN — MORPHINE SULFATE PRN MG: 2 INJECTION, SOLUTION INTRAMUSCULAR; INTRAVENOUS at 01:29

## 2021-01-09 RX ADMIN — ALBUTEROL SULFATE PRN MCG: 108 AEROSOL, METERED RESPIRATORY (INHALATION) at 08:10

## 2021-01-10 VITALS — DIASTOLIC BLOOD PRESSURE: 70 MMHG | SYSTOLIC BLOOD PRESSURE: 125 MMHG

## 2021-01-10 VITALS — DIASTOLIC BLOOD PRESSURE: 69 MMHG | SYSTOLIC BLOOD PRESSURE: 113 MMHG

## 2021-01-10 LAB
ALBUMIN SERPL-MCNC: 1.8 G/DL (ref 3.4–5)
ALP SERPL-CCNC: 78 U/L (ref 45–117)
ALT SERPL-CCNC: 74 U/L (ref 13–56)
ANION GAP SERPL CALCULATED.3IONS-SCNC: 13 MMOL/L (ref 5–15)
BILIRUB SERPL-MCNC: 0.6 MG/DL (ref 0.2–1)
BUN SERPL-MCNC: 10 MG/DL (ref 7–18)
BUN/CREAT SERPL: 43.5
CALCIUM SERPL-MCNC: 8.5 MG/DL (ref 8.5–10.1)
CHLORIDE SERPL-SCNC: 109 MMOL/L (ref 98–107)
CO2 SERPL-SCNC: 20 MMOL/L (ref 21–32)
GLUCOSE SERPL-MCNC: 100 MG/DL (ref 74–106)
HCT VFR BLD AUTO: 34.6 % (ref 36–46)
HGB BLD-MCNC: 11.2 G/DL (ref 12.2–16.2)
MAGNESIUM SERPL-MCNC: 2 MG/DL (ref 1.6–2.6)
MCH RBC QN AUTO: 25.6 PG (ref 28–32)
MCV RBC AUTO: 79.4 FL (ref 80–100)
NRBC BLD QL AUTO: 0.1 %
POTASSIUM SERPL-SCNC: 3.6 MMOL/L (ref 3.5–5.1)
PROT SERPL-MCNC: 5.2 G/DL (ref 6.4–8.2)
SODIUM SERPL-SCNC: 142 MMOL/L (ref 136–145)

## 2021-01-10 RX ADMIN — FLUTICASONE PROPIONATE SCH MCG: 50 SPRAY, METERED NASAL at 11:57

## 2021-01-10 RX ADMIN — PIPERACILLIN SODIUM AND TAZOBACTAM SODIUM SCH MLS/HR: .375; 3 INJECTION, POWDER, LYOPHILIZED, FOR SOLUTION INTRAVENOUS at 00:49

## 2021-01-10 RX ADMIN — PIPERACILLIN SODIUM AND TAZOBACTAM SODIUM SCH MLS/HR: .375; 3 INJECTION, POWDER, LYOPHILIZED, FOR SOLUTION INTRAVENOUS at 06:37

## 2021-01-10 RX ADMIN — SODIUM CHLORIDE SCH MG: 9 INJECTION, SOLUTION INTRAVENOUS at 11:58

## 2021-01-10 RX ADMIN — ENOXAPARIN SODIUM SCH MG: 60 INJECTION SUBCUTANEOUS at 11:58

## 2021-01-10 RX ADMIN — PIPERACILLIN SODIUM AND TAZOBACTAM SODIUM SCH MLS/HR: .375; 3 INJECTION, POWDER, LYOPHILIZED, FOR SOLUTION INTRAVENOUS at 14:40

## 2021-01-10 RX ADMIN — ENOXAPARIN SODIUM SCH MG: 60 INJECTION SUBCUTANEOUS at 21:40

## 2021-01-10 RX ADMIN — FLUTICASONE PROPIONATE SCH MCG: 50 SPRAY, METERED NASAL at 21:40

## 2021-01-10 RX ADMIN — ALBUTEROL SULFATE PRN MCG: 108 AEROSOL, METERED RESPIRATORY (INHALATION) at 19:28

## 2021-01-10 RX ADMIN — BUDESONIDE SCH MCG: 180 AEROSOL, POWDER RESPIRATORY (INHALATION) at 07:09

## 2021-01-10 RX ADMIN — SODIUM CHLORIDE SCH MLS/HR: 9 INJECTION, SOLUTION INTRAVENOUS at 12:11

## 2021-01-10 RX ADMIN — PANTOPRAZOLE SODIUM SCH MG: 40 INJECTION, POWDER, FOR SOLUTION INTRAVENOUS at 10:00

## 2021-01-10 RX ADMIN — PIPERACILLIN SODIUM AND TAZOBACTAM SODIUM SCH MLS/HR: .375; 3 INJECTION, POWDER, LYOPHILIZED, FOR SOLUTION INTRAVENOUS at 18:43

## 2021-01-10 RX ADMIN — BUDESONIDE SCH MCG: 180 AEROSOL, POWDER RESPIRATORY (INHALATION) at 19:28

## 2021-01-10 RX ADMIN — ALBUTEROL SULFATE PRN MCG: 108 AEROSOL, METERED RESPIRATORY (INHALATION) at 07:09

## 2021-01-11 VITALS — SYSTOLIC BLOOD PRESSURE: 127 MMHG | DIASTOLIC BLOOD PRESSURE: 89 MMHG

## 2021-01-11 VITALS — DIASTOLIC BLOOD PRESSURE: 81 MMHG | SYSTOLIC BLOOD PRESSURE: 131 MMHG

## 2021-01-11 LAB
ANION GAP SERPL CALCULATED.3IONS-SCNC: 5 MMOL/L (ref 5–15)
BUN SERPL-MCNC: 7 MG/DL (ref 7–18)
BUN/CREAT SERPL: 24.1
CALCIUM SERPL-MCNC: 7.9 MG/DL (ref 8.5–10.1)
CHLORIDE SERPL-SCNC: 110 MMOL/L (ref 98–107)
CO2 SERPL-SCNC: 25 MMOL/L (ref 21–32)
GLUCOSE SERPL-MCNC: 130 MG/DL (ref 74–106)
HCT VFR BLD AUTO: 33.6 % (ref 36–46)
HGB BLD-MCNC: 11.1 G/DL (ref 12.2–16.2)
MCH RBC QN AUTO: 26 PG (ref 28–32)
MCV RBC AUTO: 78.5 FL (ref 80–100)
NRBC BLD QL AUTO: 0.1 %
POTASSIUM SERPL-SCNC: 2.9 MMOL/L (ref 3.5–5.1)
SODIUM SERPL-SCNC: 140 MMOL/L (ref 136–145)

## 2021-01-11 RX ADMIN — PIPERACILLIN SODIUM AND TAZOBACTAM SODIUM SCH MLS/HR: .375; 3 INJECTION, POWDER, LYOPHILIZED, FOR SOLUTION INTRAVENOUS at 17:17

## 2021-01-11 RX ADMIN — BUDESONIDE SCH MCG: 180 AEROSOL, POWDER RESPIRATORY (INHALATION) at 07:02

## 2021-01-11 RX ADMIN — BUDESONIDE SCH MCG: 180 AEROSOL, POWDER RESPIRATORY (INHALATION) at 22:00

## 2021-01-11 RX ADMIN — SALINE NASAL SPRAY SCH SPR: 1.5 SOLUTION NASAL at 22:00

## 2021-01-11 RX ADMIN — PANTOPRAZOLE SODIUM SCH MG: 40 INJECTION, POWDER, FOR SOLUTION INTRAVENOUS at 10:00

## 2021-01-11 RX ADMIN — PIPERACILLIN SODIUM AND TAZOBACTAM SODIUM SCH MLS/HR: .375; 3 INJECTION, POWDER, LYOPHILIZED, FOR SOLUTION INTRAVENOUS at 00:37

## 2021-01-11 RX ADMIN — SODIUM CHLORIDE SCH MLS/HR: 9 INJECTION, SOLUTION INTRAVENOUS at 12:41

## 2021-01-11 RX ADMIN — FLUTICASONE PROPIONATE SCH MCG: 50 SPRAY, METERED NASAL at 22:27

## 2021-01-11 RX ADMIN — ALBUTEROL SULFATE PRN MCG: 108 AEROSOL, METERED RESPIRATORY (INHALATION) at 07:02

## 2021-01-11 RX ADMIN — FLUTICASONE PROPIONATE SCH MCG: 50 SPRAY, METERED NASAL at 11:17

## 2021-01-11 RX ADMIN — PIPERACILLIN SODIUM AND TAZOBACTAM SODIUM SCH MLS/HR: .375; 3 INJECTION, POWDER, LYOPHILIZED, FOR SOLUTION INTRAVENOUS at 06:35

## 2021-01-11 RX ADMIN — SODIUM CHLORIDE SCH MG: 9 INJECTION, SOLUTION INTRAVENOUS at 10:10

## 2021-01-11 RX ADMIN — ENOXAPARIN SODIUM SCH MG: 60 INJECTION SUBCUTANEOUS at 10:10

## 2021-01-11 RX ADMIN — PIPERACILLIN SODIUM AND TAZOBACTAM SODIUM SCH MLS/HR: .375; 3 INJECTION, POWDER, LYOPHILIZED, FOR SOLUTION INTRAVENOUS at 13:41

## 2021-01-11 RX ADMIN — SALINE NASAL SPRAY SCH SPR: 1.5 SOLUTION NASAL at 17:17

## 2021-01-11 RX ADMIN — ENOXAPARIN SODIUM SCH MG: 60 INJECTION SUBCUTANEOUS at 22:09

## 2021-01-12 VITALS — DIASTOLIC BLOOD PRESSURE: 74 MMHG | SYSTOLIC BLOOD PRESSURE: 118 MMHG

## 2021-01-12 VITALS — DIASTOLIC BLOOD PRESSURE: 82 MMHG | SYSTOLIC BLOOD PRESSURE: 128 MMHG

## 2021-01-12 VITALS — SYSTOLIC BLOOD PRESSURE: 138 MMHG | DIASTOLIC BLOOD PRESSURE: 84 MMHG

## 2021-01-12 LAB
ANION GAP SERPL CALCULATED.3IONS-SCNC: 9 MMOL/L (ref 5–15)
BUN SERPL-MCNC: 10 MG/DL (ref 7–18)
BUN/CREAT SERPL: 37
CALCIUM SERPL-MCNC: 8.5 MG/DL (ref 8.5–10.1)
CHLORIDE SERPL-SCNC: 112 MMOL/L (ref 98–107)
CO2 SERPL-SCNC: 21 MMOL/L (ref 21–32)
GLUCOSE SERPL-MCNC: 134 MG/DL (ref 74–106)
HCT VFR BLD AUTO: 33 % (ref 36–46)
HGB BLD-MCNC: 11 G/DL (ref 12.2–16.2)
MAGNESIUM SERPL-MCNC: 2.3 MG/DL (ref 1.6–2.6)
MCH RBC QN AUTO: 26.1 PG (ref 28–32)
MCV RBC AUTO: 78.6 FL (ref 80–100)
NRBC BLD QL AUTO: 0.2 %
POTASSIUM SERPL-SCNC: 3.5 MMOL/L (ref 3.5–5.1)
SODIUM SERPL-SCNC: 142 MMOL/L (ref 136–145)

## 2021-01-12 RX ADMIN — BUDESONIDE SCH MCG: 180 AEROSOL, POWDER RESPIRATORY (INHALATION) at 08:06

## 2021-01-12 RX ADMIN — FUROSEMIDE SCH MG: 10 INJECTION, SOLUTION INTRAMUSCULAR; INTRAVENOUS at 10:42

## 2021-01-12 RX ADMIN — FLUTICASONE PROPIONATE SCH MCG: 50 SPRAY, METERED NASAL at 10:00

## 2021-01-12 RX ADMIN — PIPERACILLIN SODIUM AND TAZOBACTAM SODIUM SCH MLS/HR: .375; 3 INJECTION, POWDER, LYOPHILIZED, FOR SOLUTION INTRAVENOUS at 12:00

## 2021-01-12 RX ADMIN — BUDESONIDE SCH MCG: 180 AEROSOL, POWDER RESPIRATORY (INHALATION) at 18:44

## 2021-01-12 RX ADMIN — ALBUTEROL SULFATE PRN MCG: 108 AEROSOL, METERED RESPIRATORY (INHALATION) at 08:06

## 2021-01-12 RX ADMIN — PANTOPRAZOLE SODIUM SCH MG: 40 INJECTION, POWDER, FOR SOLUTION INTRAVENOUS at 10:40

## 2021-01-12 RX ADMIN — PIPERACILLIN SODIUM AND TAZOBACTAM SODIUM SCH MLS/HR: .375; 3 INJECTION, POWDER, LYOPHILIZED, FOR SOLUTION INTRAVENOUS at 06:18

## 2021-01-12 RX ADMIN — ENOXAPARIN SODIUM SCH MG: 60 INJECTION SUBCUTANEOUS at 21:23

## 2021-01-12 RX ADMIN — SALINE NASAL SPRAY SCH SPR: 1.5 SOLUTION NASAL at 12:00

## 2021-01-12 RX ADMIN — FLUCONAZOLE SCH MG: 100 TABLET ORAL at 10:40

## 2021-01-12 RX ADMIN — SALINE NASAL SPRAY SCH SPR: 1.5 SOLUTION NASAL at 21:22

## 2021-01-12 RX ADMIN — ALBUTEROL SULFATE PRN MCG: 108 AEROSOL, METERED RESPIRATORY (INHALATION) at 18:44

## 2021-01-12 RX ADMIN — PIPERACILLIN SODIUM AND TAZOBACTAM SODIUM SCH MLS/HR: .375; 3 INJECTION, POWDER, LYOPHILIZED, FOR SOLUTION INTRAVENOUS at 00:23

## 2021-01-12 RX ADMIN — FLUTICASONE PROPIONATE SCH MCG: 50 SPRAY, METERED NASAL at 21:23

## 2021-01-12 RX ADMIN — PIPERACILLIN SODIUM AND TAZOBACTAM SODIUM SCH MLS/HR: .375; 3 INJECTION, POWDER, LYOPHILIZED, FOR SOLUTION INTRAVENOUS at 18:20

## 2021-01-12 RX ADMIN — SALINE NASAL SPRAY SCH SPR: 1.5 SOLUTION NASAL at 18:20

## 2021-01-12 RX ADMIN — ENOXAPARIN SODIUM SCH MG: 60 INJECTION SUBCUTANEOUS at 10:41

## 2021-01-12 RX ADMIN — SALINE NASAL SPRAY SCH SPR: 1.5 SOLUTION NASAL at 06:00

## 2021-01-13 VITALS — SYSTOLIC BLOOD PRESSURE: 142 MMHG | DIASTOLIC BLOOD PRESSURE: 73 MMHG

## 2021-01-13 VITALS — SYSTOLIC BLOOD PRESSURE: 140 MMHG | DIASTOLIC BLOOD PRESSURE: 67 MMHG

## 2021-01-13 VITALS — SYSTOLIC BLOOD PRESSURE: 125 MMHG | DIASTOLIC BLOOD PRESSURE: 73 MMHG

## 2021-01-13 VITALS — DIASTOLIC BLOOD PRESSURE: 61 MMHG | SYSTOLIC BLOOD PRESSURE: 130 MMHG

## 2021-01-13 RX ADMIN — SALINE NASAL SPRAY SCH SPR: 1.5 SOLUTION NASAL at 18:41

## 2021-01-13 RX ADMIN — ENOXAPARIN SODIUM SCH MG: 60 INJECTION SUBCUTANEOUS at 21:14

## 2021-01-13 RX ADMIN — ALBUTEROL SULFATE PRN MCG: 108 AEROSOL, METERED RESPIRATORY (INHALATION) at 19:10

## 2021-01-13 RX ADMIN — POTASSIUM CHLORIDE SCH MEQ: 1500 TABLET, EXTENDED RELEASE ORAL at 11:21

## 2021-01-13 RX ADMIN — SALINE NASAL SPRAY SCH SPR: 1.5 SOLUTION NASAL at 13:39

## 2021-01-13 RX ADMIN — FLUTICASONE PROPIONATE SCH MCG: 50 SPRAY, METERED NASAL at 21:17

## 2021-01-13 RX ADMIN — PIPERACILLIN SODIUM AND TAZOBACTAM SODIUM SCH MLS/HR: .375; 3 INJECTION, POWDER, LYOPHILIZED, FOR SOLUTION INTRAVENOUS at 05:30

## 2021-01-13 RX ADMIN — PIPERACILLIN SODIUM AND TAZOBACTAM SODIUM SCH MLS/HR: .375; 3 INJECTION, POWDER, LYOPHILIZED, FOR SOLUTION INTRAVENOUS at 00:05

## 2021-01-13 RX ADMIN — BUDESONIDE SCH MCG: 180 AEROSOL, POWDER RESPIRATORY (INHALATION) at 19:10

## 2021-01-13 RX ADMIN — PANTOPRAZOLE SODIUM SCH MG: 40 INJECTION, POWDER, FOR SOLUTION INTRAVENOUS at 11:21

## 2021-01-13 RX ADMIN — FLUTICASONE PROPIONATE SCH MCG: 50 SPRAY, METERED NASAL at 13:38

## 2021-01-13 RX ADMIN — BUDESONIDE SCH MCG: 180 AEROSOL, POWDER RESPIRATORY (INHALATION) at 06:15

## 2021-01-13 RX ADMIN — FUROSEMIDE SCH MG: 10 INJECTION, SOLUTION INTRAMUSCULAR; INTRAVENOUS at 11:21

## 2021-01-13 RX ADMIN — FLUCONAZOLE SCH MG: 100 TABLET ORAL at 11:21

## 2021-01-13 RX ADMIN — SALINE NASAL SPRAY SCH SPR: 1.5 SOLUTION NASAL at 05:31

## 2021-01-13 RX ADMIN — ENOXAPARIN SODIUM SCH MG: 60 INJECTION SUBCUTANEOUS at 11:21

## 2021-01-13 RX ADMIN — SALINE NASAL SPRAY SCH SPR: 1.5 SOLUTION NASAL at 21:19

## 2021-01-14 VITALS — SYSTOLIC BLOOD PRESSURE: 130 MMHG | DIASTOLIC BLOOD PRESSURE: 78 MMHG

## 2021-01-14 VITALS — DIASTOLIC BLOOD PRESSURE: 67 MMHG | SYSTOLIC BLOOD PRESSURE: 111 MMHG

## 2021-01-14 VITALS — DIASTOLIC BLOOD PRESSURE: 90 MMHG | SYSTOLIC BLOOD PRESSURE: 120 MMHG

## 2021-01-14 RX ADMIN — BUDESONIDE SCH MCG: 180 AEROSOL, POWDER RESPIRATORY (INHALATION) at 21:38

## 2021-01-14 RX ADMIN — PANTOPRAZOLE SODIUM SCH MG: 40 TABLET, DELAYED RELEASE ORAL at 10:30

## 2021-01-14 RX ADMIN — POTASSIUM CHLORIDE SCH MEQ: 1500 TABLET, EXTENDED RELEASE ORAL at 10:30

## 2021-01-14 RX ADMIN — FLUTICASONE PROPIONATE SCH MCG: 50 SPRAY, METERED NASAL at 10:31

## 2021-01-14 RX ADMIN — SALINE NASAL SPRAY SCH SPR: 1.5 SOLUTION NASAL at 05:29

## 2021-01-14 RX ADMIN — FUROSEMIDE SCH MG: 10 INJECTION, SOLUTION INTRAMUSCULAR; INTRAVENOUS at 10:29

## 2021-01-14 RX ADMIN — SALINE NASAL SPRAY SCH SPR: 1.5 SOLUTION NASAL at 21:05

## 2021-01-14 RX ADMIN — ALBUTEROL SULFATE PRN MCG: 108 AEROSOL, METERED RESPIRATORY (INHALATION) at 06:30

## 2021-01-14 RX ADMIN — ALBUTEROL SULFATE PRN MCG: 108 AEROSOL, METERED RESPIRATORY (INHALATION) at 21:38

## 2021-01-14 RX ADMIN — ENOXAPARIN SODIUM SCH MG: 60 INJECTION SUBCUTANEOUS at 21:04

## 2021-01-14 RX ADMIN — ENOXAPARIN SODIUM SCH MG: 60 INJECTION SUBCUTANEOUS at 10:29

## 2021-01-14 RX ADMIN — FLUTICASONE PROPIONATE SCH MCG: 50 SPRAY, METERED NASAL at 21:04

## 2021-01-14 RX ADMIN — SALINE NASAL SPRAY SCH SPR: 1.5 SOLUTION NASAL at 17:02

## 2021-01-14 RX ADMIN — SALINE NASAL SPRAY SCH SPR: 1.5 SOLUTION NASAL at 10:31

## 2021-01-14 RX ADMIN — FLUCONAZOLE SCH MG: 100 TABLET ORAL at 10:30

## 2021-01-14 RX ADMIN — BUDESONIDE SCH MCG: 180 AEROSOL, POWDER RESPIRATORY (INHALATION) at 06:30

## 2021-01-15 VITALS — SYSTOLIC BLOOD PRESSURE: 129 MMHG | DIASTOLIC BLOOD PRESSURE: 83 MMHG

## 2021-01-15 VITALS — SYSTOLIC BLOOD PRESSURE: 115 MMHG | DIASTOLIC BLOOD PRESSURE: 79 MMHG

## 2021-01-15 LAB
ANION GAP SERPL CALCULATED.3IONS-SCNC: 6 MMOL/L (ref 5–15)
BUN SERPL-MCNC: 12 MG/DL (ref 7–18)
BUN/CREAT SERPL: 30
CALCIUM SERPL-MCNC: 8.6 MG/DL (ref 8.5–10.1)
CHLORIDE SERPL-SCNC: 111 MMOL/L (ref 98–107)
CO2 SERPL-SCNC: 23 MMOL/L (ref 21–32)
GLUCOSE SERPL-MCNC: 116 MG/DL (ref 74–106)
POTASSIUM SERPL-SCNC: 3.7 MMOL/L (ref 3.5–5.1)
SODIUM SERPL-SCNC: 140 MMOL/L (ref 136–145)

## 2021-01-15 RX ADMIN — POTASSIUM CHLORIDE SCH MEQ: 1500 TABLET, EXTENDED RELEASE ORAL at 09:49

## 2021-01-15 RX ADMIN — SALINE NASAL SPRAY SCH SPR: 1.5 SOLUTION NASAL at 18:00

## 2021-01-15 RX ADMIN — BUDESONIDE SCH MCG: 180 AEROSOL, POWDER RESPIRATORY (INHALATION) at 09:12

## 2021-01-15 RX ADMIN — ENOXAPARIN SODIUM SCH MG: 60 INJECTION SUBCUTANEOUS at 09:48

## 2021-01-15 RX ADMIN — SALINE NASAL SPRAY SCH SPR: 1.5 SOLUTION NASAL at 12:26

## 2021-01-15 RX ADMIN — FUROSEMIDE SCH MG: 10 INJECTION, SOLUTION INTRAMUSCULAR; INTRAVENOUS at 09:48

## 2021-01-15 RX ADMIN — PANTOPRAZOLE SODIUM SCH MG: 40 TABLET, DELAYED RELEASE ORAL at 09:49

## 2021-01-15 RX ADMIN — SALINE NASAL SPRAY SCH SPR: 1.5 SOLUTION NASAL at 05:42

## 2021-01-15 RX ADMIN — FLUTICASONE PROPIONATE SCH MCG: 50 SPRAY, METERED NASAL at 09:48

## 2021-01-15 RX ADMIN — FLUCONAZOLE SCH MG: 100 TABLET ORAL at 09:49

## 2021-01-29 ENCOUNTER — HOSPITAL ENCOUNTER (OUTPATIENT)
Dept: HOSPITAL 15 - RAD HDHVI | Age: 61
Discharge: HOME | End: 2021-01-29
Attending: INTERNAL MEDICINE
Payer: MEDICAID

## 2021-01-29 DIAGNOSIS — I10: ICD-10-CM

## 2021-01-29 DIAGNOSIS — R00.2: ICD-10-CM

## 2021-01-29 DIAGNOSIS — R53.1: ICD-10-CM

## 2021-01-29 DIAGNOSIS — R30.0: ICD-10-CM

## 2021-01-29 DIAGNOSIS — R91.8: Primary | ICD-10-CM

## 2021-01-29 DIAGNOSIS — E11.9: ICD-10-CM

## 2021-01-29 DIAGNOSIS — J84.89: ICD-10-CM

## 2021-01-29 DIAGNOSIS — E55.9: ICD-10-CM

## 2021-01-29 DIAGNOSIS — D51.3: ICD-10-CM

## 2021-01-29 DIAGNOSIS — D64.9: ICD-10-CM

## 2021-01-29 DIAGNOSIS — R06.02: ICD-10-CM

## 2021-01-29 LAB
ALBUMIN SERPL-MCNC: 2.9 G/DL (ref 3.4–5)
ALP SERPL-CCNC: 111 U/L (ref 45–117)
ALT SERPL-CCNC: 57 U/L (ref 13–56)
ANION GAP SERPL CALCULATED.3IONS-SCNC: 8 MMOL/L (ref 5–15)
BILIRUB SERPL-MCNC: 0.5 MG/DL (ref 0.2–1)
BUN SERPL-MCNC: 10 MG/DL (ref 7–18)
BUN/CREAT SERPL: 20.4
CALCIUM SERPL-MCNC: 9 MG/DL (ref 8.5–10.1)
CHLORIDE SERPL-SCNC: 106 MMOL/L (ref 98–107)
CHOLEST SERPL-MCNC: 250 MG/DL (ref ?–200)
CO2 SERPL-SCNC: 26 MMOL/L (ref 21–32)
GLUCOSE SERPL-MCNC: 123 MG/DL (ref 74–106)
HCT VFR BLD AUTO: 39.4 % (ref 36–46)
HDLC SERPL-MCNC: 38 MG/DL (ref 40–59)
HGB BLD-MCNC: 12.8 G/DL (ref 12.2–16.2)
MCH RBC QN AUTO: 26.3 PG (ref 28–32)
MCV RBC AUTO: 80.8 FL (ref 80–100)
POTASSIUM SERPL-SCNC: 3.6 MMOL/L (ref 3.5–5.1)
PROT SERPL-MCNC: 7.2 G/DL (ref 6.4–8.2)
SODIUM SERPL-SCNC: 140 MMOL/L (ref 136–145)
T4 FREE SERPL-MCNC: 1.16 NG/DL (ref 0.89–1.76)
TRIGL SERPL-MCNC: 220 MG/DL (ref ?–150)

## 2021-01-29 PROCEDURE — 84439 ASSAY OF FREE THYROXINE: CPT

## 2021-01-29 PROCEDURE — 82306 VITAMIN D 25 HYDROXY: CPT

## 2021-01-29 PROCEDURE — 71046 X-RAY EXAM CHEST 2 VIEWS: CPT

## 2021-01-29 PROCEDURE — 85007 BL SMEAR W/DIFF WBC COUNT: CPT

## 2021-01-29 PROCEDURE — 83036 HEMOGLOBIN GLYCOSYLATED A1C: CPT

## 2021-01-29 PROCEDURE — 84443 ASSAY THYROID STIM HORMONE: CPT

## 2021-01-29 PROCEDURE — 82607 VITAMIN B-12: CPT

## 2021-01-29 PROCEDURE — 80061 LIPID PANEL: CPT

## 2021-01-29 PROCEDURE — 81003 URINALYSIS AUTO W/O SCOPE: CPT

## 2021-01-29 PROCEDURE — 36415 COLL VENOUS BLD VENIPUNCTURE: CPT

## 2021-01-29 PROCEDURE — 80053 COMPREHEN METABOLIC PANEL: CPT

## 2021-01-29 PROCEDURE — 85027 COMPLETE CBC AUTOMATED: CPT

## 2021-02-22 ENCOUNTER — HOSPITAL ENCOUNTER (OUTPATIENT)
Dept: HOSPITAL 15 - RAD HDHVI | Age: 61
Discharge: HOME | End: 2021-02-22
Attending: INTERNAL MEDICINE
Payer: COMMERCIAL

## 2021-02-22 DIAGNOSIS — A41.9: ICD-10-CM

## 2021-02-22 DIAGNOSIS — E66.01: Primary | ICD-10-CM

## 2021-02-22 PROCEDURE — 93306 TTE W/DOPPLER COMPLETE: CPT

## 2021-03-01 ENCOUNTER — HOSPITAL ENCOUNTER (OUTPATIENT)
Dept: HOSPITAL 15 - LAB | Age: 61
Discharge: HOME | End: 2021-03-01
Attending: INTERNAL MEDICINE
Payer: COMMERCIAL

## 2021-03-01 DIAGNOSIS — R94.4: Primary | ICD-10-CM

## 2021-03-01 LAB — BUN SERPL-MCNC: 11 MG/DL (ref 7–18)

## 2021-03-01 PROCEDURE — 82565 ASSAY OF CREATININE: CPT

## 2021-03-01 PROCEDURE — 84520 ASSAY OF UREA NITROGEN: CPT

## 2021-03-01 PROCEDURE — 36415 COLL VENOUS BLD VENIPUNCTURE: CPT

## 2021-03-02 ENCOUNTER — HOSPITAL ENCOUNTER (OUTPATIENT)
Dept: HOSPITAL 15 - RAD HDHVI | Age: 61
Discharge: HOME | End: 2021-03-02
Attending: INTERNAL MEDICINE
Payer: COMMERCIAL

## 2021-03-02 VITALS — DIASTOLIC BLOOD PRESSURE: 76 MMHG | SYSTOLIC BLOOD PRESSURE: 128 MMHG

## 2021-03-02 VITALS — DIASTOLIC BLOOD PRESSURE: 68 MMHG | SYSTOLIC BLOOD PRESSURE: 118 MMHG

## 2021-03-02 DIAGNOSIS — I26.99: Primary | ICD-10-CM

## 2021-03-02 DIAGNOSIS — K44.9: ICD-10-CM

## 2021-03-02 DIAGNOSIS — J84.89: ICD-10-CM

## 2021-03-02 DIAGNOSIS — R91.8: ICD-10-CM

## 2021-03-02 DIAGNOSIS — R06.02: ICD-10-CM

## 2021-03-02 PROCEDURE — 71260 CT THORAX DX C+: CPT

## 2021-03-04 ENCOUNTER — HOSPITAL ENCOUNTER (OUTPATIENT)
Dept: HOSPITAL 15 - RAD HDHVI | Age: 61
Discharge: HOME | End: 2021-03-04
Attending: INTERNAL MEDICINE
Payer: MEDICAID

## 2021-03-04 VITALS — BODY MASS INDEX: 28.25 KG/M2 | HEIGHT: 67 IN | WEIGHT: 180 LBS

## 2021-03-04 DIAGNOSIS — J45.909: ICD-10-CM

## 2021-03-04 DIAGNOSIS — E78.5: ICD-10-CM

## 2021-03-04 DIAGNOSIS — R00.2: ICD-10-CM

## 2021-03-04 DIAGNOSIS — I10: Primary | ICD-10-CM

## 2021-03-04 DIAGNOSIS — R06.02: ICD-10-CM

## 2021-03-04 PROCEDURE — 93005 ELECTROCARDIOGRAM TRACING: CPT

## 2021-03-04 PROCEDURE — 96375 TX/PRO/DX INJ NEW DRUG ADDON: CPT

## 2021-03-04 PROCEDURE — 78452 HT MUSCLE IMAGE SPECT MULT: CPT

## 2021-03-04 PROCEDURE — 96374 THER/PROPH/DIAG INJ IV PUSH: CPT

## 2021-05-07 ENCOUNTER — HOSPITAL ENCOUNTER (OUTPATIENT)
Dept: HOSPITAL 15 - RT | Age: 61
Discharge: HOME | End: 2021-05-07
Attending: INTERNAL MEDICINE
Payer: MEDICAID

## 2021-05-07 DIAGNOSIS — Z20.822: Primary | ICD-10-CM

## 2021-05-07 DIAGNOSIS — Z86.16: ICD-10-CM

## 2021-05-07 PROCEDURE — 87426 SARSCOV CORONAVIRUS AG IA: CPT

## 2021-05-07 PROCEDURE — 94060 EVALUATION OF WHEEZING: CPT

## 2021-05-07 PROCEDURE — 94618 PULMONARY STRESS TESTING: CPT

## 2021-05-07 PROCEDURE — 94729 DIFFUSING CAPACITY: CPT

## 2021-05-07 PROCEDURE — 36415 COLL VENOUS BLD VENIPUNCTURE: CPT

## 2021-05-07 PROCEDURE — 94727 GAS DIL/WSHOT DETER LNG VOL: CPT

## 2021-07-02 ENCOUNTER — HOSPITAL ENCOUNTER (OUTPATIENT)
Dept: HOSPITAL 15 - RAD HDHVI | Age: 61
Discharge: HOME | End: 2021-07-02
Attending: INTERNAL MEDICINE
Payer: MEDICAID

## 2021-07-02 DIAGNOSIS — R06.02: ICD-10-CM

## 2021-07-02 DIAGNOSIS — E78.5: Primary | ICD-10-CM

## 2021-07-02 PROCEDURE — 93306 TTE W/DOPPLER COMPLETE: CPT

## 2022-06-08 ENCOUNTER — HOSPITAL ENCOUNTER (OUTPATIENT)
Dept: HOSPITAL 15 - RAD HDHVI | Age: 62
Discharge: HOME | End: 2022-06-08
Attending: INTERNAL MEDICINE
Payer: MEDICAID

## 2022-06-08 DIAGNOSIS — I80.3: Primary | ICD-10-CM

## 2022-06-08 DIAGNOSIS — M79.89: ICD-10-CM

## 2022-06-08 DIAGNOSIS — L03.90: ICD-10-CM

## 2022-06-08 PROCEDURE — 93971 EXTREMITY STUDY: CPT

## 2022-12-02 ENCOUNTER — HOSPITAL ENCOUNTER (INPATIENT)
Dept: HOSPITAL 15 - ER | Age: 62
LOS: 5 days | Discharge: HOME | DRG: 133 | End: 2022-12-07
Attending: INTERNAL MEDICINE
Payer: MEDICAID

## 2022-12-02 VITALS — BODY MASS INDEX: 35.57 KG/M2 | WEIGHT: 226.64 LBS | HEIGHT: 67 IN

## 2022-12-02 VITALS — DIASTOLIC BLOOD PRESSURE: 64 MMHG | SYSTOLIC BLOOD PRESSURE: 121 MMHG

## 2022-12-02 DIAGNOSIS — I50.9: ICD-10-CM

## 2022-12-02 DIAGNOSIS — R73.9: ICD-10-CM

## 2022-12-02 DIAGNOSIS — J44.0: ICD-10-CM

## 2022-12-02 DIAGNOSIS — D72.829: ICD-10-CM

## 2022-12-02 DIAGNOSIS — J44.1: ICD-10-CM

## 2022-12-02 DIAGNOSIS — E66.01: ICD-10-CM

## 2022-12-02 DIAGNOSIS — J18.9: ICD-10-CM

## 2022-12-02 DIAGNOSIS — Z86.711: ICD-10-CM

## 2022-12-02 DIAGNOSIS — Z20.822: ICD-10-CM

## 2022-12-02 DIAGNOSIS — J45.901: ICD-10-CM

## 2022-12-02 DIAGNOSIS — J96.01: Primary | ICD-10-CM

## 2022-12-02 LAB
ALBUMIN SERPL-MCNC: 3.8 G/DL (ref 3.4–5)
ALP SERPL-CCNC: 114 U/L (ref 45–117)
ALT SERPL-CCNC: 35 U/L (ref 13–56)
ANION GAP SERPL CALCULATED.3IONS-SCNC: 4 MMOL/L (ref 5–15)
BILIRUB SERPL-MCNC: 0.5 MG/DL (ref 0.2–1)
BUN SERPL-MCNC: 12 MG/DL (ref 7–18)
BUN/CREAT SERPL: 14.3
CALCIUM SERPL-MCNC: 9.5 MG/DL (ref 8.5–10.1)
CHLORIDE SERPL-SCNC: 106 MMOL/L (ref 98–107)
CHOLEST SERPL-MCNC: 138 MG/DL (ref ?–200)
CO2 SERPL-SCNC: 27 MMOL/L (ref 21–32)
GLUCOSE SERPL-MCNC: 130 MG/DL (ref 74–106)
HCT VFR BLD AUTO: 46 % (ref 36–46)
HDLC SERPL-MCNC: 42 MG/DL (ref 40–59)
HGB BLD-MCNC: 14.9 G/DL (ref 12.2–16.2)
MCH RBC QN AUTO: 27.3 PG (ref 28–32)
MCV RBC AUTO: 84.3 FL (ref 80–100)
POTASSIUM SERPL-SCNC: 3.9 MMOL/L (ref 3.5–5.1)
PROT SERPL-MCNC: 8.4 G/DL (ref 6.4–8.2)
SODIUM SERPL-SCNC: 137 MMOL/L (ref 136–145)
TRIGL SERPL-MCNC: 92 MG/DL (ref ?–150)

## 2022-12-02 PROCEDURE — 80053 COMPREHEN METABOLIC PANEL: CPT

## 2022-12-02 PROCEDURE — 83036 HEMOGLOBIN GLYCOSYLATED A1C: CPT

## 2022-12-02 PROCEDURE — 85007 BL SMEAR W/DIFF WBC COUNT: CPT

## 2022-12-02 PROCEDURE — 36415 COLL VENOUS BLD VENIPUNCTURE: CPT

## 2022-12-02 PROCEDURE — 96365 THER/PROPH/DIAG IV INF INIT: CPT

## 2022-12-02 PROCEDURE — 81001 URINALYSIS AUTO W/SCOPE: CPT

## 2022-12-02 PROCEDURE — 87804 INFLUENZA ASSAY W/OPTIC: CPT

## 2022-12-02 PROCEDURE — 85027 COMPLETE CBC AUTOMATED: CPT

## 2022-12-02 PROCEDURE — 96375 TX/PRO/DX INJ NEW DRUG ADDON: CPT

## 2022-12-02 PROCEDURE — 71046 X-RAY EXAM CHEST 2 VIEWS: CPT

## 2022-12-02 PROCEDURE — 87340 HEPATITIS B SURFACE AG IA: CPT

## 2022-12-02 PROCEDURE — 86803 HEPATITIS C AB TEST: CPT

## 2022-12-02 PROCEDURE — 85025 COMPLETE CBC W/AUTO DIFF WBC: CPT

## 2022-12-02 PROCEDURE — 85379 FIBRIN DEGRADATION QUANT: CPT

## 2022-12-02 PROCEDURE — 93005 ELECTROCARDIOGRAM TRACING: CPT

## 2022-12-02 PROCEDURE — 80061 LIPID PANEL: CPT

## 2022-12-02 PROCEDURE — 83605 ASSAY OF LACTIC ACID: CPT

## 2022-12-02 PROCEDURE — 96367 TX/PROPH/DG ADDL SEQ IV INF: CPT

## 2022-12-02 PROCEDURE — 94640 AIRWAY INHALATION TREATMENT: CPT

## 2022-12-02 PROCEDURE — 87040 BLOOD CULTURE FOR BACTERIA: CPT

## 2022-12-02 PROCEDURE — 84443 ASSAY THYROID STIM HORMONE: CPT

## 2022-12-02 PROCEDURE — 87426 SARSCOV CORONAVIRUS AG IA: CPT

## 2022-12-03 LAB
ALBUMIN SERPL-MCNC: 3.3 G/DL (ref 3.4–5)
ALP SERPL-CCNC: 100 U/L (ref 45–117)
ALT SERPL-CCNC: 31 U/L (ref 13–56)
ANION GAP SERPL CALCULATED.3IONS-SCNC: 9 MMOL/L (ref 5–15)
BILIRUB SERPL-MCNC: 0.9 MG/DL (ref 0.2–1)
BUN SERPL-MCNC: 12 MG/DL (ref 7–18)
BUN/CREAT SERPL: 15.4
CALCIUM SERPL-MCNC: 9 MG/DL (ref 8.5–10.1)
CHLORIDE SERPL-SCNC: 104 MMOL/L (ref 98–107)
CO2 SERPL-SCNC: 25 MMOL/L (ref 21–32)
GLUCOSE SERPL-MCNC: 137 MG/DL (ref 74–106)
HCT VFR BLD AUTO: 44.7 % (ref 36–46)
HGB BLD-MCNC: 14.6 G/DL (ref 12.2–16.2)
MCH RBC QN AUTO: 27.4 PG (ref 28–32)
MCV RBC AUTO: 83.9 FL (ref 80–100)
NRBC BLD QL AUTO: 0 %
POTASSIUM SERPL-SCNC: 3.5 MMOL/L (ref 3.5–5.1)
PROT SERPL-MCNC: 8 G/DL (ref 6.4–8.2)
SODIUM SERPL-SCNC: 138 MMOL/L (ref 136–145)

## 2022-12-03 RX ADMIN — METHYLPREDNISOLONE SODIUM SUCCINATE SCH MG: 125 INJECTION, POWDER, FOR SOLUTION INTRAMUSCULAR; INTRAVENOUS at 07:55

## 2022-12-03 RX ADMIN — ACETAMINOPHEN PRN MG: 325 TABLET ORAL at 12:08

## 2022-12-03 RX ADMIN — CEFTRIAXONE SODIUM SCH MLS/HR: 1 INJECTION, POWDER, FOR SOLUTION INTRAMUSCULAR; INTRAVENOUS at 08:06

## 2022-12-03 RX ADMIN — OXYCODONE HYDROCHLORIDE AND ACETAMINOPHEN SCH MG: 500 TABLET ORAL at 11:21

## 2022-12-03 RX ADMIN — ACETAMINOPHEN PRN MG: 325 TABLET ORAL at 04:01

## 2022-12-03 RX ADMIN — IPRATROPIUM BROMIDE SCH MG: 0.5 SOLUTION RESPIRATORY (INHALATION) at 08:51

## 2022-12-03 RX ADMIN — IPRATROPIUM BROMIDE SCH MG: 0.5 SOLUTION RESPIRATORY (INHALATION) at 12:35

## 2022-12-03 RX ADMIN — ZINC SULFATE CAP 220 MG (50 MG ELEMENTAL ZN) SCH MG: 220 (50 ZN) CAP at 11:21

## 2022-12-03 RX ADMIN — ALBUTEROL SULFATE SCH MG: 2.5 SOLUTION RESPIRATORY (INHALATION) at 03:12

## 2022-12-03 RX ADMIN — ALBUTEROL SULFATE SCH MG: 2.5 SOLUTION RESPIRATORY (INHALATION) at 08:51

## 2022-12-03 RX ADMIN — SODIUM CHLORIDE SCH MLS/HR: 0.9 INJECTION, SOLUTION INTRAVENOUS at 07:55

## 2022-12-03 RX ADMIN — METHYLPREDNISOLONE SODIUM SUCCINATE SCH MG: 125 INJECTION, POWDER, FOR SOLUTION INTRAMUSCULAR; INTRAVENOUS at 11:22

## 2022-12-03 RX ADMIN — IPRATROPIUM BROMIDE SCH MG: 0.5 SOLUTION RESPIRATORY (INHALATION) at 18:00

## 2022-12-03 RX ADMIN — MULTIVITAMIN TABLET SCH TAB: TABLET at 11:21

## 2022-12-03 RX ADMIN — ENOXAPARIN SODIUM SCH MG: 40 INJECTION SUBCUTANEOUS at 11:22

## 2022-12-03 RX ADMIN — METHYLPREDNISOLONE SODIUM SUCCINATE SCH MG: 125 INJECTION, POWDER, FOR SOLUTION INTRAMUSCULAR; INTRAVENOUS at 22:39

## 2022-12-03 RX ADMIN — OXYCODONE HYDROCHLORIDE AND ACETAMINOPHEN SCH MG: 500 TABLET ORAL at 03:54

## 2022-12-03 RX ADMIN — ALBUTEROL SULFATE SCH MG: 2.5 SOLUTION RESPIRATORY (INHALATION) at 18:00

## 2022-12-03 RX ADMIN — OXYCODONE HYDROCHLORIDE AND ACETAMINOPHEN SCH MG: 500 TABLET ORAL at 22:39

## 2022-12-03 RX ADMIN — SODIUM CHLORIDE SCH MLS/HR: 0.9 INJECTION, SOLUTION INTRAVENOUS at 13:29

## 2022-12-03 RX ADMIN — IPRATROPIUM BROMIDE SCH MG: 0.5 SOLUTION RESPIRATORY (INHALATION) at 03:12

## 2022-12-03 RX ADMIN — ALBUTEROL SULFATE SCH MG: 2.5 SOLUTION RESPIRATORY (INHALATION) at 12:35

## 2022-12-04 VITALS — SYSTOLIC BLOOD PRESSURE: 130 MMHG | DIASTOLIC BLOOD PRESSURE: 53 MMHG

## 2022-12-04 VITALS — DIASTOLIC BLOOD PRESSURE: 60 MMHG | SYSTOLIC BLOOD PRESSURE: 116 MMHG

## 2022-12-04 RX ADMIN — ENOXAPARIN SODIUM SCH MG: 40 INJECTION SUBCUTANEOUS at 09:34

## 2022-12-04 RX ADMIN — OXYCODONE HYDROCHLORIDE AND ACETAMINOPHEN SCH MG: 500 TABLET ORAL at 22:03

## 2022-12-04 RX ADMIN — ALBUTEROL SULFATE SCH MG: 2.5 SOLUTION RESPIRATORY (INHALATION) at 19:37

## 2022-12-04 RX ADMIN — ALBUTEROL SULFATE SCH MG: 2.5 SOLUTION RESPIRATORY (INHALATION) at 00:21

## 2022-12-04 RX ADMIN — IPRATROPIUM BROMIDE SCH MG: 0.5 SOLUTION RESPIRATORY (INHALATION) at 13:43

## 2022-12-04 RX ADMIN — ALBUTEROL SULFATE SCH MG: 2.5 SOLUTION RESPIRATORY (INHALATION) at 13:42

## 2022-12-04 RX ADMIN — ZINC SULFATE CAP 220 MG (50 MG ELEMENTAL ZN) SCH MG: 220 (50 ZN) CAP at 09:33

## 2022-12-04 RX ADMIN — OXYCODONE HYDROCHLORIDE AND ACETAMINOPHEN SCH MG: 500 TABLET ORAL at 09:33

## 2022-12-04 RX ADMIN — SODIUM CHLORIDE SCH MLS/HR: 0.9 INJECTION, SOLUTION INTRAVENOUS at 05:35

## 2022-12-04 RX ADMIN — IPRATROPIUM BROMIDE SCH MG: 0.5 SOLUTION RESPIRATORY (INHALATION) at 00:21

## 2022-12-04 RX ADMIN — ALBUTEROL SULFATE SCH MG: 2.5 SOLUTION RESPIRATORY (INHALATION) at 06:54

## 2022-12-04 RX ADMIN — METHYLPREDNISOLONE SODIUM SUCCINATE SCH MG: 125 INJECTION, POWDER, FOR SOLUTION INTRAMUSCULAR; INTRAVENOUS at 09:33

## 2022-12-04 RX ADMIN — IPRATROPIUM BROMIDE SCH MG: 0.5 SOLUTION RESPIRATORY (INHALATION) at 06:00

## 2022-12-04 RX ADMIN — MULTIVITAMIN TABLET SCH TAB: TABLET at 09:33

## 2022-12-04 RX ADMIN — METHYLPREDNISOLONE SODIUM SUCCINATE SCH MG: 125 INJECTION, POWDER, FOR SOLUTION INTRAMUSCULAR; INTRAVENOUS at 22:03

## 2022-12-04 RX ADMIN — CEFTRIAXONE SODIUM SCH MLS/HR: 1 INJECTION, POWDER, FOR SOLUTION INTRAMUSCULAR; INTRAVENOUS at 09:32

## 2022-12-04 RX ADMIN — IPRATROPIUM BROMIDE SCH MG: 0.5 SOLUTION RESPIRATORY (INHALATION) at 19:38

## 2022-12-04 RX ADMIN — ACETAMINOPHEN PRN MG: 325 TABLET ORAL at 11:03

## 2022-12-05 VITALS — SYSTOLIC BLOOD PRESSURE: 151 MMHG | DIASTOLIC BLOOD PRESSURE: 84 MMHG

## 2022-12-05 VITALS — DIASTOLIC BLOOD PRESSURE: 84 MMHG | SYSTOLIC BLOOD PRESSURE: 151 MMHG

## 2022-12-05 VITALS — SYSTOLIC BLOOD PRESSURE: 137 MMHG | DIASTOLIC BLOOD PRESSURE: 68 MMHG

## 2022-12-05 VITALS — SYSTOLIC BLOOD PRESSURE: 142 MMHG | DIASTOLIC BLOOD PRESSURE: 70 MMHG

## 2022-12-05 VITALS — DIASTOLIC BLOOD PRESSURE: 60 MMHG | SYSTOLIC BLOOD PRESSURE: 116 MMHG

## 2022-12-05 VITALS — SYSTOLIC BLOOD PRESSURE: 142 MMHG | DIASTOLIC BLOOD PRESSURE: 71 MMHG

## 2022-12-05 VITALS — SYSTOLIC BLOOD PRESSURE: 132 MMHG | DIASTOLIC BLOOD PRESSURE: 73 MMHG

## 2022-12-05 RX ADMIN — MULTIVITAMIN TABLET SCH TAB: TABLET at 08:13

## 2022-12-05 RX ADMIN — ENOXAPARIN SODIUM SCH MG: 40 INJECTION SUBCUTANEOUS at 08:13

## 2022-12-05 RX ADMIN — CEFTRIAXONE SODIUM SCH MLS/HR: 1 INJECTION, POWDER, FOR SOLUTION INTRAMUSCULAR; INTRAVENOUS at 08:13

## 2022-12-05 RX ADMIN — GUAIFENESIN AND DEXTROMETHORPHAN PRN ML: 100; 10 SYRUP ORAL at 14:04

## 2022-12-05 RX ADMIN — ZINC SULFATE CAP 220 MG (50 MG ELEMENTAL ZN) SCH MG: 220 (50 ZN) CAP at 08:13

## 2022-12-05 RX ADMIN — SODIUM CHLORIDE SCH MLS/HR: 0.9 INJECTION, SOLUTION INTRAVENOUS at 16:44

## 2022-12-05 RX ADMIN — ALBUTEROL SULFATE SCH MG: 2.5 SOLUTION RESPIRATORY (INHALATION) at 11:58

## 2022-12-05 RX ADMIN — OXYCODONE HYDROCHLORIDE AND ACETAMINOPHEN SCH MG: 500 TABLET ORAL at 08:13

## 2022-12-05 RX ADMIN — ALBUTEROL SULFATE SCH MG: 2.5 SOLUTION RESPIRATORY (INHALATION) at 00:58

## 2022-12-05 RX ADMIN — GUAIFENESIN AND DEXTROMETHORPHAN PRN ML: 100; 10 SYRUP ORAL at 20:48

## 2022-12-05 RX ADMIN — IPRATROPIUM BROMIDE SCH MG: 0.5 SOLUTION RESPIRATORY (INHALATION) at 05:38

## 2022-12-05 RX ADMIN — ALBUTEROL SULFATE SCH MG: 2.5 SOLUTION RESPIRATORY (INHALATION) at 18:05

## 2022-12-05 RX ADMIN — METHYLPREDNISOLONE SODIUM SUCCINATE SCH MG: 125 INJECTION, POWDER, FOR SOLUTION INTRAMUSCULAR; INTRAVENOUS at 08:13

## 2022-12-05 RX ADMIN — IPRATROPIUM BROMIDE SCH MG: 0.5 SOLUTION RESPIRATORY (INHALATION) at 11:58

## 2022-12-05 RX ADMIN — METHYLPREDNISOLONE SODIUM SUCCINATE SCH MG: 125 INJECTION, POWDER, FOR SOLUTION INTRAMUSCULAR; INTRAVENOUS at 20:48

## 2022-12-05 RX ADMIN — IPRATROPIUM BROMIDE SCH MG: 0.5 SOLUTION RESPIRATORY (INHALATION) at 18:05

## 2022-12-05 RX ADMIN — OXYCODONE HYDROCHLORIDE AND ACETAMINOPHEN SCH MG: 500 TABLET ORAL at 20:47

## 2022-12-05 RX ADMIN — SODIUM CHLORIDE SCH MLS/HR: 0.9 INJECTION, SOLUTION INTRAVENOUS at 00:15

## 2022-12-05 RX ADMIN — IPRATROPIUM BROMIDE SCH MG: 0.5 SOLUTION RESPIRATORY (INHALATION) at 00:58

## 2022-12-05 RX ADMIN — ALBUTEROL SULFATE SCH MG: 2.5 SOLUTION RESPIRATORY (INHALATION) at 05:38

## 2022-12-06 VITALS — DIASTOLIC BLOOD PRESSURE: 70 MMHG | SYSTOLIC BLOOD PRESSURE: 142 MMHG

## 2022-12-06 VITALS — DIASTOLIC BLOOD PRESSURE: 75 MMHG | SYSTOLIC BLOOD PRESSURE: 147 MMHG

## 2022-12-06 VITALS — SYSTOLIC BLOOD PRESSURE: 145 MMHG | DIASTOLIC BLOOD PRESSURE: 75 MMHG

## 2022-12-06 VITALS — SYSTOLIC BLOOD PRESSURE: 143 MMHG | DIASTOLIC BLOOD PRESSURE: 76 MMHG

## 2022-12-06 VITALS — SYSTOLIC BLOOD PRESSURE: 131 MMHG | DIASTOLIC BLOOD PRESSURE: 65 MMHG

## 2022-12-06 VITALS — DIASTOLIC BLOOD PRESSURE: 83 MMHG | SYSTOLIC BLOOD PRESSURE: 148 MMHG

## 2022-12-06 RX ADMIN — ALBUTEROL SULFATE SCH MG: 2.5 SOLUTION RESPIRATORY (INHALATION) at 20:21

## 2022-12-06 RX ADMIN — IPRATROPIUM BROMIDE SCH MG: 0.5 SOLUTION RESPIRATORY (INHALATION) at 20:21

## 2022-12-06 RX ADMIN — CEFTRIAXONE SODIUM SCH MLS/HR: 1 INJECTION, POWDER, FOR SOLUTION INTRAMUSCULAR; INTRAVENOUS at 09:06

## 2022-12-06 RX ADMIN — MULTIVITAMIN TABLET SCH TAB: TABLET at 09:07

## 2022-12-06 RX ADMIN — SODIUM CHLORIDE SCH MLS/HR: 0.9 INJECTION, SOLUTION INTRAVENOUS at 09:10

## 2022-12-06 RX ADMIN — AZITHROMYCIN MONOHYDRATE SCH MG: 250 TABLET ORAL at 11:31

## 2022-12-06 RX ADMIN — ALBUTEROL SULFATE SCH MG: 2.5 SOLUTION RESPIRATORY (INHALATION) at 06:10

## 2022-12-06 RX ADMIN — METHYLPREDNISOLONE SODIUM SUCCINATE SCH MG: 125 INJECTION, POWDER, FOR SOLUTION INTRAMUSCULAR; INTRAVENOUS at 21:38

## 2022-12-06 RX ADMIN — ALBUTEROL SULFATE SCH MG: 2.5 SOLUTION RESPIRATORY (INHALATION) at 12:18

## 2022-12-06 RX ADMIN — GUAIFENESIN AND DEXTROMETHORPHAN PRN ML: 100; 10 SYRUP ORAL at 06:02

## 2022-12-06 RX ADMIN — ALBUTEROL SULFATE SCH MG: 2.5 SOLUTION RESPIRATORY (INHALATION) at 00:21

## 2022-12-06 RX ADMIN — GUAIFENESIN AND DEXTROMETHORPHAN PRN ML: 100; 10 SYRUP ORAL at 18:41

## 2022-12-06 RX ADMIN — IPRATROPIUM BROMIDE SCH MG: 0.5 SOLUTION RESPIRATORY (INHALATION) at 06:10

## 2022-12-06 RX ADMIN — OXYCODONE HYDROCHLORIDE AND ACETAMINOPHEN SCH MG: 500 TABLET ORAL at 09:06

## 2022-12-06 RX ADMIN — ZINC SULFATE CAP 220 MG (50 MG ELEMENTAL ZN) SCH MG: 220 (50 ZN) CAP at 09:06

## 2022-12-06 RX ADMIN — ENOXAPARIN SODIUM SCH MG: 40 INJECTION SUBCUTANEOUS at 09:06

## 2022-12-06 RX ADMIN — OXYCODONE HYDROCHLORIDE AND ACETAMINOPHEN SCH MG: 500 TABLET ORAL at 21:37

## 2022-12-06 RX ADMIN — METHYLPREDNISOLONE SODIUM SUCCINATE SCH MG: 125 INJECTION, POWDER, FOR SOLUTION INTRAMUSCULAR; INTRAVENOUS at 09:06

## 2022-12-06 RX ADMIN — IPRATROPIUM BROMIDE SCH MG: 0.5 SOLUTION RESPIRATORY (INHALATION) at 00:21

## 2022-12-06 RX ADMIN — IPRATROPIUM BROMIDE SCH MG: 0.5 SOLUTION RESPIRATORY (INHALATION) at 12:18

## 2022-12-07 VITALS — SYSTOLIC BLOOD PRESSURE: 132 MMHG | DIASTOLIC BLOOD PRESSURE: 78 MMHG

## 2022-12-07 VITALS — DIASTOLIC BLOOD PRESSURE: 78 MMHG | SYSTOLIC BLOOD PRESSURE: 132 MMHG

## 2022-12-07 VITALS — DIASTOLIC BLOOD PRESSURE: 81 MMHG | SYSTOLIC BLOOD PRESSURE: 158 MMHG

## 2022-12-07 VITALS — SYSTOLIC BLOOD PRESSURE: 138 MMHG | DIASTOLIC BLOOD PRESSURE: 86 MMHG

## 2022-12-07 RX ADMIN — IPRATROPIUM BROMIDE SCH MG: 0.5 SOLUTION RESPIRATORY (INHALATION) at 07:18

## 2022-12-07 RX ADMIN — METHYLPREDNISOLONE SODIUM SUCCINATE SCH MG: 125 INJECTION, POWDER, FOR SOLUTION INTRAMUSCULAR; INTRAVENOUS at 10:06

## 2022-12-07 RX ADMIN — CEFTRIAXONE SODIUM SCH MLS/HR: 1 INJECTION, POWDER, FOR SOLUTION INTRAMUSCULAR; INTRAVENOUS at 10:06

## 2022-12-07 RX ADMIN — ZINC SULFATE CAP 220 MG (50 MG ELEMENTAL ZN) SCH MG: 220 (50 ZN) CAP at 10:07

## 2022-12-07 RX ADMIN — IPRATROPIUM BROMIDE SCH MG: 0.5 SOLUTION RESPIRATORY (INHALATION) at 12:43

## 2022-12-07 RX ADMIN — GUAIFENESIN AND DEXTROMETHORPHAN PRN ML: 100; 10 SYRUP ORAL at 08:36

## 2022-12-07 RX ADMIN — IPRATROPIUM BROMIDE SCH MG: 0.5 SOLUTION RESPIRATORY (INHALATION) at 00:21

## 2022-12-07 RX ADMIN — ALBUTEROL SULFATE SCH MG: 2.5 SOLUTION RESPIRATORY (INHALATION) at 12:42

## 2022-12-07 RX ADMIN — ALBUTEROL SULFATE SCH MG: 2.5 SOLUTION RESPIRATORY (INHALATION) at 00:21

## 2022-12-07 RX ADMIN — ENOXAPARIN SODIUM SCH MG: 40 INJECTION SUBCUTANEOUS at 10:00

## 2022-12-07 RX ADMIN — MULTIVITAMIN TABLET SCH TAB: TABLET at 10:07

## 2022-12-07 RX ADMIN — OXYCODONE HYDROCHLORIDE AND ACETAMINOPHEN SCH MG: 500 TABLET ORAL at 10:07

## 2022-12-07 RX ADMIN — AZITHROMYCIN MONOHYDRATE SCH MG: 250 TABLET ORAL at 10:07

## 2022-12-07 RX ADMIN — SODIUM CHLORIDE SCH MLS/HR: 0.9 INJECTION, SOLUTION INTRAVENOUS at 00:49

## 2022-12-07 RX ADMIN — ALBUTEROL SULFATE SCH MG: 2.5 SOLUTION RESPIRATORY (INHALATION) at 07:18

## 2023-01-26 ENCOUNTER — HOSPITAL ENCOUNTER (EMERGENCY)
Dept: HOSPITAL 15 - ER | Age: 63
Discharge: HOME | End: 2023-01-26
Payer: MEDICAID

## 2023-01-26 VITALS — WEIGHT: 211.86 LBS | HEIGHT: 67 IN | BODY MASS INDEX: 33.25 KG/M2

## 2023-01-26 VITALS — SYSTOLIC BLOOD PRESSURE: 132 MMHG | DIASTOLIC BLOOD PRESSURE: 74 MMHG

## 2023-01-26 DIAGNOSIS — J45.909: ICD-10-CM

## 2023-01-26 DIAGNOSIS — I83.891: Primary | ICD-10-CM

## 2023-01-26 DIAGNOSIS — I50.9: ICD-10-CM

## 2023-01-26 DIAGNOSIS — Z79.899: ICD-10-CM

## 2023-01-26 PROCEDURE — 93971 EXTREMITY STUDY: CPT
